# Patient Record
Sex: FEMALE | Race: WHITE | Employment: UNEMPLOYED | URBAN - METROPOLITAN AREA
[De-identification: names, ages, dates, MRNs, and addresses within clinical notes are randomized per-mention and may not be internally consistent; named-entity substitution may affect disease eponyms.]

---

## 2018-03-20 ENCOUNTER — HOSPITAL ENCOUNTER (EMERGENCY)
Facility: HOSPITAL | Age: 33
Discharge: HOME | End: 2018-03-20
Attending: EMERGENCY MEDICINE
Payer: MEDICAID

## 2018-03-20 VITALS
DIASTOLIC BLOOD PRESSURE: 80 MMHG | OXYGEN SATURATION: 95 % | WEIGHT: 175 LBS | HEART RATE: 53 BPM | SYSTOLIC BLOOD PRESSURE: 117 MMHG | RESPIRATION RATE: 16 BRPM | BODY MASS INDEX: 29.16 KG/M2 | HEIGHT: 65 IN | TEMPERATURE: 99 F

## 2018-03-20 DIAGNOSIS — R20.2 PARESTHESIAS: Primary | ICD-10-CM

## 2018-03-20 DIAGNOSIS — L65.9 ALOPECIA OF SCALP: ICD-10-CM

## 2018-03-20 LAB
ALBUMIN SERPL-MCNC: 4.4 G/DL (ref 3.5–4.8)
ALP LIVER SERPL-CCNC: 57 U/L (ref 37–98)
ALT SERPL-CCNC: 15 U/L (ref 14–54)
AST SERPL-CCNC: 13 U/L (ref 15–41)
BASOPHILS # BLD AUTO: 0.04 X10(3) UL (ref 0–0.1)
BASOPHILS NFR BLD AUTO: 0.5 %
BILIRUB SERPL-MCNC: 0.4 MG/DL (ref 0.1–2)
BUN BLD-MCNC: 7 MG/DL (ref 8–20)
CALCIUM BLD-MCNC: 9.3 MG/DL (ref 8.3–10.3)
CHLORIDE: 106 MMOL/L (ref 101–111)
CO2: 21 MMOL/L (ref 22–32)
CREAT BLD-MCNC: 0.91 MG/DL (ref 0.55–1.02)
EOSINOPHIL # BLD AUTO: 0.28 X10(3) UL (ref 0–0.3)
EOSINOPHIL NFR BLD AUTO: 3.5 %
ERYTHROCYTE [DISTWIDTH] IN BLOOD BY AUTOMATED COUNT: 12.2 % (ref 11.5–16)
GLUCOSE BLD-MCNC: 90 MG/DL (ref 70–99)
HAV IGM SER QL: 2.3 MG/DL (ref 1.7–3)
HCT VFR BLD AUTO: 42.5 % (ref 34–50)
HGB BLD-MCNC: 14.9 G/DL (ref 12–16)
IMMATURE GRANULOCYTE COUNT: 0.01 X10(3) UL (ref 0–1)
IMMATURE GRANULOCYTE RATIO %: 0.1 %
LYMPHOCYTES # BLD AUTO: 2.12 X10(3) UL (ref 0.9–4)
LYMPHOCYTES NFR BLD AUTO: 26.2 %
M PROTEIN MFR SERPL ELPH: 8.6 G/DL (ref 6.1–8.3)
MCH RBC QN AUTO: 30.2 PG (ref 27–33.2)
MCHC RBC AUTO-ENTMCNC: 35.1 G/DL (ref 31–37)
MCV RBC AUTO: 86.2 FL (ref 81–100)
MONOCYTES # BLD AUTO: 0.44 X10(3) UL (ref 0.1–1)
MONOCYTES NFR BLD AUTO: 5.4 %
NEUTROPHIL ABS PRELIM: 5.19 X10 (3) UL (ref 1.3–6.7)
NEUTROPHILS # BLD AUTO: 5.19 X10(3) UL (ref 1.3–6.7)
NEUTROPHILS NFR BLD AUTO: 64.3 %
PLATELET # BLD AUTO: 298 10(3)UL (ref 150–450)
POCT URINE PREGNANCY: NEGATIVE
POTASSIUM SERPL-SCNC: 3.6 MMOL/L (ref 3.6–5.1)
RBC # BLD AUTO: 4.93 X10(6)UL (ref 3.8–5.1)
RED CELL DISTRIBUTION WIDTH-SD: 38.3 FL (ref 35.1–46.3)
SODIUM SERPL-SCNC: 136 MMOL/L (ref 136–144)
TSI SER-ACNC: 1.68 MIU/ML (ref 0.35–5.5)
WBC # BLD AUTO: 8.1 X10(3) UL (ref 4–13)

## 2018-03-20 PROCEDURE — 99283 EMERGENCY DEPT VISIT LOW MDM: CPT

## 2018-03-20 PROCEDURE — 80050 GENERAL HEALTH PANEL: CPT | Performed by: EMERGENCY MEDICINE

## 2018-03-20 PROCEDURE — 36415 COLL VENOUS BLD VENIPUNCTURE: CPT

## 2018-03-20 PROCEDURE — 81025 URINE PREGNANCY TEST: CPT

## 2018-03-20 PROCEDURE — 99284 EMERGENCY DEPT VISIT MOD MDM: CPT

## 2018-03-20 PROCEDURE — 83735 ASSAY OF MAGNESIUM: CPT | Performed by: EMERGENCY MEDICINE

## 2018-03-20 RX ORDER — ASPIRIN 81 MG/1
324 TABLET, CHEWABLE ORAL ONCE
Status: DISCONTINUED | OUTPATIENT
Start: 2018-03-20 | End: 2018-03-20

## 2018-03-20 NOTE — ED PROVIDER NOTES
Patient Seen in: [facility] Emergency Department    History   Patient presents with:   Other    Stated Complaint: \"stinging pain on my scalp & loosing clumps of my hair\"    HPI    Patient is a 25-year-old female with a history of depression and PCO Pulmonary/Chest: Effort normal and breath sounds normal.   Abdominal: Soft. Bowel sounds are normal.   Musculoskeletal: Normal range of motion. Neurological: She is alert and oriented to person, place, and time. Skin: Skin is warm and dry.    No scalp are all normal.  Patient will be discharged, recommend follow-up with her primary care doctor for further evaluation.       Disposition and Plan     Clinical Impression:  Paresthesias  (primary encounter diagnosis)  Alopecia of scalp    Disposition:  Shanae Greer

## 2018-03-20 NOTE — ED INITIAL ASSESSMENT (HPI)
Pt c/o \"very intense itching and burning like my scalp is on fire. I've also been loosing clumps of my hair which has never happened before\". This has been going on for 1-2 months but patient states she feels it has gotten worse over the past week.

## 2018-07-13 ENCOUNTER — TELEPHONE (OUTPATIENT)
Dept: FAMILY MEDICINE CLINIC | Facility: CLINIC | Age: 33
End: 2018-07-13

## 2018-07-13 NOTE — TELEPHONE ENCOUNTER
Patient made an appt via my chart for Dr Andres Gardner but a different PCP is listed. She also has [employer] replacement HMO now so I l/m vc mail to confirm who the PCP is on her new coverage card.  Must be Andres Gardner in order for us to see her

## 2018-07-13 NOTE — TELEPHONE ENCOUNTER
I called [facility] and confirmed her PCP for her ins is Dr. Vlad Suarez, nor Bhavani Thompson. The patient can call to change her PCP.  I called the patient and left another message advising I am cancelling the appt she has scheduled and that we are happy to

## 2019-08-11 ENCOUNTER — HOSPITAL ENCOUNTER (EMERGENCY)
Facility: HOSPITAL | Age: 34
Discharge: HOME | End: 2019-08-11
Attending: EMERGENCY MEDICINE
Payer: MEDICAID

## 2019-08-11 VITALS
HEIGHT: 65 IN | HEART RATE: 62 BPM | TEMPERATURE: 98 F | BODY MASS INDEX: 29.99 KG/M2 | RESPIRATION RATE: 16 BRPM | OXYGEN SATURATION: 95 % | DIASTOLIC BLOOD PRESSURE: 66 MMHG | SYSTOLIC BLOOD PRESSURE: 137 MMHG | WEIGHT: 180 LBS

## 2019-08-11 DIAGNOSIS — S90.569A INSECT BITE OF ANKLE, UNSPECIFIED LATERALITY, INITIAL ENCOUNTER: Primary | ICD-10-CM
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
## 2019-08-11 PROCEDURE — 99283 EMERGENCY DEPT VISIT LOW MDM: CPT

## 2019-08-11 RX ORDER — PREDNISONE 20 MG/1
20 TABLET ORAL DAILY
Qty: 3 TABLET | Refills: 0 | Status: SHIPPED | OUTPATIENT
Start: 2019-08-11 | End: 2019-08-14

## 2019-08-11 RX ORDER — HYDROXYZINE HYDROCHLORIDE 25 MG/1
TABLET, FILM COATED ORAL EVERY 4 HOURS PRN
Qty: 20 TABLET | Refills: 0 | Status: SHIPPED | OUTPATIENT
Start: 2019-08-11 | End: 2019-09-10

## 2019-08-11 NOTE — ED INITIAL ASSESSMENT (HPI)
Pt complains of \"bites\" coming out on body,  Knees to feet bilaterally, and \"a big one\" on her left shoulder.

## 2019-08-12 ENCOUNTER — TELEPHONE (OUTPATIENT)
Dept: FAMILY MEDICINE CLINIC | Facility: CLINIC | Age: 34
End: 2019-08-12

## 2019-08-12 NOTE — ED PROVIDER NOTES
Patient Seen in: [facility] Emergency Department    History   Patient presents with:  Bite Sting,Insect (integumentary)    Stated Complaint: rash - possible bug bites     HPI    Patient presents with bites.   The patient states that she noticed bites and oriented x3 in no acute distress. HEENT: Normocephalic, atraumatic, pupils equal round and reactive to light, oropharynx clear, uvula midline-no edema. Neck: Supple. Cardiovascular: Regular rate and rhythm, no murmurs.   Respiratory: Lungs clear to a

## 2019-10-03 ENCOUNTER — HOSPITAL ENCOUNTER (EMERGENCY)
Facility: HOSPITAL | Age: 34
Discharge: HOME | End: 2019-10-03
Attending: EMERGENCY MEDICINE

## 2019-10-03 VITALS
SYSTOLIC BLOOD PRESSURE: 144 MMHG | RESPIRATION RATE: 16 BRPM | WEIGHT: 180 LBS | TEMPERATURE: 97 F | HEART RATE: 65 BPM | OXYGEN SATURATION: 95 % | BODY MASS INDEX: 29.99 KG/M2 | HEIGHT: 65 IN | DIASTOLIC BLOOD PRESSURE: 85 MMHG

## 2019-10-03 DIAGNOSIS — J01.90 ACUTE NON-RECURRENT SINUSITIS, UNSPECIFIED LOCATION: Primary | ICD-10-CM

## 2019-10-03 PROCEDURE — 99283 EMERGENCY DEPT VISIT LOW MDM: CPT

## 2019-10-03 RX ORDER — AZITHROMYCIN 250 MG/1
250 TABLET, FILM COATED ORAL DAILY
Qty: 6 TABLET | Refills: 0 | Status: SHIPPED | OUTPATIENT
Start: 2019-10-03 | End: 2019-10-08

## 2019-10-03 NOTE — ED PROVIDER NOTES
Patient Seen in: [facility] Emergency Department      History   Patient presents with:  Cough/URI    Stated Complaint: cold    HPI    41-year-old female comes to the hospital the complaint of having difficulty with sinus pressure.   The patient has p tenderness  Extremity no clubbing, cyanosis or edema noted.   Full range of motion noted without tenderness  Neuro: No focal deficits noted    ED Course   Labs Reviewed - No data to display       Patient will be discharged with outpatient management for acu

## 2019-10-03 NOTE — ED INITIAL ASSESSMENT (HPI)
Pt c/o sore throat, chills and runny nose since Sunday.      Pt states she tried OTC meds with no relief

## 2020-12-08 ENCOUNTER — APPOINTMENT (OUTPATIENT)
Dept: CT IMAGING | Facility: HOSPITAL | Age: 35
End: 2020-12-08
Attending: EMERGENCY MEDICINE
Payer: MEDICAID

## 2020-12-08 ENCOUNTER — APPOINTMENT (OUTPATIENT)
Dept: GENERAL RADIOLOGY | Facility: HOSPITAL | Age: 35
End: 2020-12-08
Attending: EMERGENCY MEDICINE
Payer: MEDICAID

## 2020-12-08 ENCOUNTER — HOSPITAL ENCOUNTER (EMERGENCY)
Facility: HOSPITAL | Age: 35
Discharge: HOME | End: 2020-12-08
Attending: EMERGENCY MEDICINE
Payer: MEDICAID

## 2020-12-08 VITALS
RESPIRATION RATE: 16 BRPM | OXYGEN SATURATION: 95 % | SYSTOLIC BLOOD PRESSURE: 116 MMHG | DIASTOLIC BLOOD PRESSURE: 68 MMHG | BODY MASS INDEX: 29.99 KG/M2 | WEIGHT: 180 LBS | HEART RATE: 57 BPM | HEIGHT: 65 IN | TEMPERATURE: 99 F

## 2020-12-08 DIAGNOSIS — R93.5 ABNORMAL CT OF THE ABDOMEN: ICD-10-CM

## 2020-12-08 DIAGNOSIS — K59.00 CONSTIPATION, UNSPECIFIED CONSTIPATION TYPE: Primary | ICD-10-CM

## 2020-12-08 PROCEDURE — 99285 EMERGENCY DEPT VISIT HI MDM: CPT

## 2020-12-08 PROCEDURE — 81001 URINALYSIS AUTO W/SCOPE: CPT | Performed by: EMERGENCY MEDICINE

## 2020-12-08 PROCEDURE — 83690 ASSAY OF LIPASE: CPT | Performed by: EMERGENCY MEDICINE

## 2020-12-08 PROCEDURE — 81025 URINE PREGNANCY TEST: CPT

## 2020-12-08 PROCEDURE — 85025 COMPLETE CBC W/AUTO DIFF WBC: CPT | Performed by: EMERGENCY MEDICINE

## 2020-12-08 PROCEDURE — 74177 CT ABD & PELVIS W/CONTRAST: CPT | Performed by: EMERGENCY MEDICINE

## 2020-12-08 PROCEDURE — 96360 HYDRATION IV INFUSION INIT: CPT

## 2020-12-08 PROCEDURE — 96361 HYDRATE IV INFUSION ADD-ON: CPT

## 2020-12-08 PROCEDURE — 80053 COMPREHEN METABOLIC PANEL: CPT | Performed by: EMERGENCY MEDICINE

## 2020-12-08 RX ORDER — SODIUM CHLORIDE 9 MG/ML
INJECTION, SOLUTION INTRAVENOUS CONTINUOUS
Status: DISCONTINUED | OUTPATIENT
Start: 2020-12-08 | End: 2020-12-08

## 2020-12-08 NOTE — ED INITIAL ASSESSMENT (HPI)
Pt to ED for c/o RUQ pain intermittent x 4 months, worsened over the the last few days w/ occasional nausea. Hx of Chronic Constipation & IBS. Last BM yesterday.

## 2020-12-08 NOTE — ED PROVIDER NOTES
CM called and spoke with patient, explained outpt CM role with introduction  Patient agreeable to follow up  CM reviewed discharge instructions and medication list with patient  Patient has been to PCP for hospital follow up , has another appt scheduled for September, saw Cardiology on 6/5 with follow up in November, Patient stated she did not want to follow up with Pulmonologist or have sleep study   Patient stated she manages her own medications  Patient stating she is feeling good, cough is gone, denies CP, SOB ,fevers  Patient's hsuband takes her BP few times a week, last reading was 132/59  Patient does not drive, her , family or friends provide transportation  CM made patient aware CM will follow up in 2-3 weeks  Patient wanted to wait to receive business card in the mail  CM mailed business card and bundle paperwork to patient  Patient Seen in: [facility] Emergency Department      History   Patient presents with:  Abdomen/Flank Pain    Stated Complaint: abdominal pain, hx IBS/constipation    HPI    Patient is a pleasant 13-year-old female, presenting for evaluation of abdo comfortably on the cart, in no apparent distress. HEENT: Head is without evidence of trauma. Extraocular muscles are intact. Pupils are equal and reactive to light. NECK: Neck is supple. The trachea is midline.    LUNGS: Lungs are clear, respirations ar CT, CT APPENDIX ABD/PEL WO CONTRAST (CPT=74176), 5/08/2016, 7:01 PM.  INDICATIONS:  abdominal pain, hx IBS/constipation  TECHNIQUE:  CT scanning was performed from the dome of the diaphragm to the pubic symphysis with non-ionic intravenous contrast materia thickening, lesion, or calculus. PELVIC NODES:  No adenopathy. PELVIC ORGANS:  The uterus and adnexa appear normal.  There is a 1.5 cm nabothian cysts noted. BONES:  No bony lesion or fracture. LUNG BASES:  No visible pulmonary or pleural disease.   OTHE subsequently discharged at her request.                   Disposition and Plan     Clinical Impression:  Constipation, unspecified constipation type  (primary encounter diagnosis)  Abnormal CT of the abdomen    Disposition:  Discharge  12/8/2020  5:34 pm

## 2020-12-14 ENCOUNTER — ORDER TRANSCRIPTION (OUTPATIENT)
Dept: ADMINISTRATIVE | Facility: HOSPITAL | Age: 35
End: 2020-12-14

## 2020-12-14 DIAGNOSIS — Z20.822 ENCOUNTER FOR LABORATORY TESTING FOR COVID-19 VIRUS: Primary | ICD-10-CM

## 2020-12-14 DIAGNOSIS — Z01.812 PRE-PROCEDURE LAB EXAM: ICD-10-CM

## 2020-12-15 ENCOUNTER — ORDER TRANSCRIPTION (OUTPATIENT)
Dept: ADMINISTRATIVE | Facility: HOSPITAL | Age: 35
End: 2020-12-15

## 2020-12-15 DIAGNOSIS — Z11.59 ENCOUNTER FOR SCREENING FOR OTHER VIRAL DISEASES: Primary | ICD-10-CM

## 2020-12-15 DIAGNOSIS — Z01.818 PREOP EXAMINATION: ICD-10-CM

## 2020-12-20 ENCOUNTER — LAB ENCOUNTER (OUTPATIENT)
Dept: LAB | Facility: HOSPITAL | Age: 35
End: 2020-12-20
Attending: EMERGENCY MEDICINE
Payer: MEDICAID

## 2020-12-20 DIAGNOSIS — Z11.59 ENCOUNTER FOR SCREENING FOR OTHER VIRAL DISEASES: ICD-10-CM

## 2020-12-20 DIAGNOSIS — Z20.822 ENCOUNTER FOR LABORATORY TESTING FOR COVID-19 VIRUS: ICD-10-CM

## 2020-12-20 DIAGNOSIS — Z01.818 PREOP EXAMINATION: ICD-10-CM

## 2020-12-20 DIAGNOSIS — Z01.812 PRE-PROCEDURE LAB EXAM: ICD-10-CM

## 2020-12-22 DIAGNOSIS — K59.00 CONSTIPATION, UNSPECIFIED CONSTIPATION TYPE: Primary | ICD-10-CM

## 2021-01-28 ENCOUNTER — OFFICE VISIT (OUTPATIENT)
Dept: FAMILY MEDICINE CLINIC | Facility: CLINIC | Age: 36
End: 2021-01-28
Payer: MEDICAID

## 2021-01-28 ENCOUNTER — LAB ENCOUNTER (OUTPATIENT)
Dept: LAB | Age: 36
End: 2021-01-28
Attending: FAMILY MEDICINE
Payer: MEDICAID

## 2021-01-28 VITALS
HEART RATE: 71 BPM | BODY MASS INDEX: 30 KG/M2 | OXYGEN SATURATION: 96 % | TEMPERATURE: 99 F | DIASTOLIC BLOOD PRESSURE: 82 MMHG | SYSTOLIC BLOOD PRESSURE: 122 MMHG | RESPIRATION RATE: 16 BRPM | HEIGHT: 65 IN

## 2021-01-28 DIAGNOSIS — K59.00 CONSTIPATION, UNSPECIFIED CONSTIPATION TYPE: ICD-10-CM

## 2021-01-28 DIAGNOSIS — Z13.220 SCREENING CHOLESTEROL LEVEL: ICD-10-CM

## 2021-01-28 DIAGNOSIS — K59.00 CONSTIPATION, UNSPECIFIED CONSTIPATION TYPE: Primary | ICD-10-CM

## 2021-01-28 PROBLEM — F31.81 BIPOLAR 2 DISORDER (HCC): Status: ACTIVE | Noted: 2017-03-02

## 2021-01-28 PROBLEM — R10.9 ABDOMINAL PAIN: Status: ACTIVE | Noted: 2017-07-06

## 2021-01-28 LAB
CHOLEST SMN-MCNC: 187 MG/DL (ref ?–200)
HDLC SERPL-MCNC: 45 MG/DL (ref 40–59)
LDLC SERPL CALC-MCNC: 107 MG/DL (ref ?–100)
NONHDLC SERPL-MCNC: 142 MG/DL (ref ?–130)
PATIENT FASTING Y/N/NP: YES
T4 FREE SERPL-MCNC: 1.1 NG/DL (ref 0.8–1.7)
TRIGL SERPL-MCNC: 173 MG/DL (ref 30–149)
TSI SER-ACNC: 1.62 MIU/ML (ref 0.36–3.74)
VLDLC SERPL CALC-MCNC: 35 MG/DL (ref 0–30)

## 2021-01-28 PROCEDURE — 3079F DIAST BP 80-89 MM HG: CPT | Performed by: FAMILY MEDICINE

## 2021-01-28 PROCEDURE — 3074F SYST BP LT 130 MM HG: CPT | Performed by: FAMILY MEDICINE

## 2021-01-28 PROCEDURE — 80061 LIPID PANEL: CPT

## 2021-01-28 PROCEDURE — 84443 ASSAY THYROID STIM HORMONE: CPT

## 2021-01-28 PROCEDURE — 3008F BODY MASS INDEX DOCD: CPT | Performed by: FAMILY MEDICINE

## 2021-01-28 PROCEDURE — 99204 OFFICE O/P NEW MOD 45 MIN: CPT | Performed by: FAMILY MEDICINE

## 2021-01-28 PROCEDURE — 84439 ASSAY OF FREE THYROXINE: CPT

## 2021-01-28 PROCEDURE — 36415 COLL VENOUS BLD VENIPUNCTURE: CPT

## 2021-01-28 NOTE — PATIENT INSTRUCTIONS
Dietary ways to work with constipation:   -prunes/juice, pear juice  -high fiber foods (raw fruits and veggies minus bananas, brown rice, whole grain bread).    -limit breads,cheeses, and bananas  -increase water  Stool softeners: fiber supplements eg: [place] Constipation is very common. At some point in life, it affects almost everyone. Since everyone's bowel habits are different, what is constipation to one person may not be to another. Your healthcare provider may do tests to diagnose constipation.  It depend Lifestyle changes  These lifestyle changes can help prevent constipation:  · Diet. Eat a high-fiber diet, with fresh fruit and vegetables, and reduce dairy intake, meats, and processed foods  · Fluids. It's important to get enough fluids each day.  Drink pl Vaughn last reviewed this educational content on 6/1/2018  © 9722-2262 [employer]. [facility] [place]. All rights reserved. This information is not intended as a substitute for professional medical care.  Always follo · Beans. One cup of cooked lentils gives you over 15 grams of fiber. Try navy beans, lentils, and chickpeas. · Seeds. A small handful of seeds gives you about 3 grams of fiber. Try sunflower or kajal seeds.   Keep track of your fiber  Keep track of how much One of the best ways to help treat constipation is to increase your fiber intake. You can do this either through diet or by using fiber supplements. Fiber (in whole grains, fruits, and vegetables) adds bulk and absorbs water to soften the stool.  This helps

## 2021-01-28 NOTE — PROGRESS NOTES
[facility] Visit Note  1/28/2021      Subjective:      Patient ID: Alo Garay is a 38year old female.     Chief Complaint:  Patient presents with:  New Patient  Stomach Pain: and constipation, was treated at [facility] ER 12/8/20      HPI:  Aris Erickson 01/28/21  0850   BP: 122/82   Pulse: 71   Resp: 16   Temp: 98.6 °F (37 °C)   TempSrc: Temporal   SpO2: 96%   Height: 5' 5\" (1.651 m)       Physical Examination   General:  Alert, in no acute distress  HEENT: NCAT, EOMI, normal TMs, oropharynx, and nasal

## 2021-02-03 ENCOUNTER — TELEPHONE (OUTPATIENT)
Dept: FAMILY MEDICINE CLINIC | Facility: CLINIC | Age: 36
End: 2021-02-03

## 2021-02-03 NOTE — TELEPHONE ENCOUNTER
Pt is requesting MD put in a script for pain due to lower abdominal pain.      LOV: 1/28/2021 (New patient, constipation)   Pt does not want to see a specialist.    [address] [facility] [phone], [phone]

## 2021-02-04 NOTE — TELEPHONE ENCOUNTER
Pain medications can actually worsen constipation. TO see the specialist please. If still has lots of questions or not satisfied with plan to make sooner appt.  thanks

## 2021-02-07 ENCOUNTER — LAB ENCOUNTER (OUTPATIENT)
Dept: LAB | Facility: HOSPITAL | Age: 36
End: 2021-02-07
Attending: COLON & RECTAL SURGERY
Payer: MEDICAID

## 2021-02-07 DIAGNOSIS — K59.00 CONSTIPATION, UNSPECIFIED CONSTIPATION TYPE: ICD-10-CM

## 2021-02-09 ENCOUNTER — TELEPHONE (OUTPATIENT)
Dept: SURGERY | Facility: CLINIC | Age: 36
End: 2021-02-09

## 2021-02-09 LAB — SARS-COV-2 RNA RESP QL NAA+PROBE: NOT DETECTED

## 2021-02-09 NOTE — TELEPHONE ENCOUNTER
Pt asking how long her radiology test would take. Gve her information for scheduling and they would have this info.

## 2021-02-10 ENCOUNTER — HOSPITAL ENCOUNTER (OUTPATIENT)
Dept: GENERAL RADIOLOGY | Age: 36
Discharge: HOME | End: 2021-02-10
Attending: COLON & RECTAL SURGERY
Payer: MEDICAID

## 2021-02-10 DIAGNOSIS — K59.00 CONSTIPATION, UNSPECIFIED CONSTIPATION TYPE: ICD-10-CM

## 2021-02-26 ENCOUNTER — PATIENT MESSAGE (OUTPATIENT)
Dept: FAMILY MEDICINE CLINIC | Facility: CLINIC | Age: 36
End: 2021-02-26

## 2021-02-26 RX ORDER — ONDANSETRON 4 MG/1
4 TABLET, ORALLY DISINTEGRATING ORAL EVERY 8 HOURS PRN
Qty: 20 TABLET | Refills: 0 | Status: SHIPPED | OUTPATIENT
Start: 2021-02-26 | End: 2021-03-01

## 2021-02-26 NOTE — TELEPHONE ENCOUNTER
See pt message, please advise, recommend pt follow up with GI if she is not comfortable seeing Gen Surg?

## 2021-02-26 NOTE — TELEPHONE ENCOUNTER
From: Patrice Blood  To: Rayo Taylor MD  Sent: 2/26/2021 8:38 AM CST  Subject: Other    Hi Dr Luis Jennings,    I am reaching out to you, because lately I have not gotten better when it goes for the chronic constipation and stomach pain.      When I d

## 2021-03-01 ENCOUNTER — TELEMEDICINE (OUTPATIENT)
Dept: FAMILY MEDICINE CLINIC | Facility: CLINIC | Age: 36
End: 2021-03-01

## 2021-03-01 ENCOUNTER — PATIENT MESSAGE (OUTPATIENT)
Dept: FAMILY MEDICINE CLINIC | Facility: CLINIC | Age: 36
End: 2021-03-01

## 2021-03-01 DIAGNOSIS — N92.1 MENORRHAGIA WITH IRREGULAR CYCLE: ICD-10-CM

## 2021-03-01 DIAGNOSIS — N92.6 IRREGULAR PERIODS: Primary | ICD-10-CM

## 2021-03-01 DIAGNOSIS — E28.2 PCOS (POLYCYSTIC OVARIAN SYNDROME): ICD-10-CM

## 2021-03-01 PROBLEM — R10.9 ABDOMINAL PAIN: Status: RESOLVED | Noted: 2017-07-06 | Resolved: 2021-03-01

## 2021-03-01 PROCEDURE — 99214 OFFICE O/P EST MOD 30 MIN: CPT | Performed by: FAMILY MEDICINE

## 2021-03-01 RX ORDER — NORGESTIMATE AND ETHINYL ESTRADIOL 0.25-0.035
1 KIT ORAL DAILY
Qty: 84 TABLET | Refills: 3 | Status: SHIPPED | OUTPATIENT
Start: 2021-03-01 | End: 2021-03-04 | Stop reason: SINTOL

## 2021-03-01 RX ORDER — ONDANSETRON 4 MG/1
4 TABLET, ORALLY DISINTEGRATING ORAL EVERY 8 HOURS PRN
Qty: 20 TABLET | Refills: 0 | Status: SHIPPED | OUTPATIENT
Start: 2021-03-01 | End: 2021-11-15 | Stop reason: ALTCHOICE

## 2021-03-01 NOTE — PROGRESS NOTES
Video Visit- Yg   This is a telemedicine visit with live, interactive video and audio.      Fatoumata Flores understands and accepts financial responsibility for any deductible, co-insurance and/or co-pa breathing  ABDOMEN:  not obese  MUSCULOSKELETAL: Sitting upright. NEUROLOGIC:  Cranial nerves 2-12 grossly intact. PSYCHIATRIC:  Normal mood, affect, and hygiene. Assessment/Plan:  1.  Irregular periods  - OBG - INTERNAL  - [employer] Briana Johnson

## 2021-03-03 RX ORDER — ONDANSETRON 4 MG/1
4 TABLET, ORALLY DISINTEGRATING ORAL EVERY 8 HOURS PRN
Qty: 20 TABLET | Refills: 0 | Status: CANCELLED | OUTPATIENT
Start: 2021-03-03

## 2021-03-03 NOTE — TELEPHONE ENCOUNTER
ondansetron 4 MG Oral Tablet Dispersible          Sig: Take 1 tablet (4 mg total) by mouth every 8 (eight) hours as needed for Nausea.     Disp:  20 tablet    Refills:  0    Start: 3/3/2021    Class: Normal    Non-formulary    Last ordered: 2 days ago by [employer]

## 2021-04-05 ENCOUNTER — PATIENT MESSAGE (OUTPATIENT)
Dept: FAMILY MEDICINE CLINIC | Facility: CLINIC | Age: 36
End: 2021-04-05

## 2021-04-05 NOTE — TELEPHONE ENCOUNTER
From: Patrice Blood  To: Rayo Taylor MD  Sent: 4/5/2021 10:16 AM CDT  Subject: Other    Hi Dr. Randall Morton all is well with you. I am reaching out to you because I have been battling with really bad depression and really bad anxiety.      I

## 2021-04-06 PROBLEM — F40.00 AGORAPHOBIA: Status: ACTIVE | Noted: 2021-04-06

## 2021-04-06 PROBLEM — F33.1 MODERATE EPISODE OF RECURRENT MAJOR DEPRESSIVE DISORDER (HCC): Status: ACTIVE | Noted: 2021-04-06

## 2021-04-06 PROBLEM — F41.0 PANIC ATTACK: Status: ACTIVE | Noted: 2021-04-06

## 2021-04-06 NOTE — PROGRESS NOTES
Video Visit- Yg 26  This is a telemedicine visit with live, interactive video and audio.      Yeimi Crow understands and accepts financial responsibility for any deductible, co-insurance and/or co-pa comfortably, normal work of breathing  ABDOMEN:  not obese  MUSCULOSKELETAL: Sitting upright. NEUROLOGIC:  Cranial nerves 2-12 grossly intact. PSYCHIATRIC:  Sad mood, normal affect, and hygiene. Assessment/Plan:  1.  Moderate episode of recurrent

## 2021-05-04 ENCOUNTER — OFFICE VISIT (OUTPATIENT)
Dept: SURGERY | Facility: CLINIC | Age: 36
End: 2021-05-04
Payer: MEDICAID

## 2021-05-04 VITALS
WEIGHT: 185 LBS | DIASTOLIC BLOOD PRESSURE: 90 MMHG | HEIGHT: 65 IN | HEART RATE: 75 BPM | BODY MASS INDEX: 30.82 KG/M2 | TEMPERATURE: 98 F | SYSTOLIC BLOOD PRESSURE: 129 MMHG

## 2021-05-04 DIAGNOSIS — Z01.818 PRE-OP TESTING: ICD-10-CM

## 2021-05-04 DIAGNOSIS — K59.00 CONSTIPATION, UNSPECIFIED CONSTIPATION TYPE: Primary | ICD-10-CM

## 2021-05-04 PROCEDURE — 3074F SYST BP LT 130 MM HG: CPT | Performed by: COLON & RECTAL SURGERY

## 2021-05-04 PROCEDURE — 99244 OFF/OP CNSLTJ NEW/EST MOD 40: CPT | Performed by: COLON & RECTAL SURGERY

## 2021-05-04 PROCEDURE — 3080F DIAST BP >= 90 MM HG: CPT | Performed by: COLON & RECTAL SURGERY

## 2021-05-04 PROCEDURE — 3008F BODY MASS INDEX DOCD: CPT | Performed by: COLON & RECTAL SURGERY

## 2021-05-04 RX ORDER — SODIUM, POTASSIUM,MAG SULFATES 17.5-3.13G
SOLUTION, RECONSTITUTED, ORAL ORAL
Qty: 1 KIT | Refills: 0 | Status: SHIPPED | OUTPATIENT
Start: 2021-05-04 | End: 2021-05-28

## 2021-05-04 NOTE — H&P
New Patient Visit Note       Active Problems      1. Constipation, unspecified constipation type    2.  Pre-op testing        Chief Complaint   Patient presents with:  Abdominal Pain      History of Present Illness   Sarah Deal is a 38year old femal • Chronic constipation    • Depression    • IBS (irritable bowel syndrome)    • Ovarian cyst    • Polycystic ovarian syndrome      Past Surgical History:   Procedure Laterality Date   • D & C         The family history and social history have been revie were discussed as well. The risks of colonoscopy were discussed with the patient and include but are not limited to post-procedure bleeding, infection, colorectal perforation, splenic injury, missed polyps, need for additional surgeries or interventions.  A

## 2021-05-08 ENCOUNTER — PATIENT MESSAGE (OUTPATIENT)
Dept: FAMILY MEDICINE CLINIC | Facility: CLINIC | Age: 36
End: 2021-05-08

## 2021-05-08 DIAGNOSIS — K59.00 CONSTIPATION, UNSPECIFIED CONSTIPATION TYPE: Primary | ICD-10-CM

## 2021-05-10 NOTE — TELEPHONE ENCOUNTER
From: Anne Benavides  To:  Ness Marquez MD  Sent: 5/8/2021 1:13 PM CDT  Subject: Referral Request    Hi Dr. Sanjeev Lazo or Nurse/Staff,    I am reaching out to see if there is a way for Dr. Sanjeev Lazo to refer me to a different gastroenterologist that is

## 2021-05-12 ENCOUNTER — TELEPHONE (OUTPATIENT)
Dept: SURGERY | Facility: CLINIC | Age: 36
End: 2021-05-12

## 2021-05-12 NOTE — TELEPHONE ENCOUNTER
Pt called and left a message to [facility] on 5-18.  I tried calling pt back, no answer I left a voicemail

## 2021-05-24 ENCOUNTER — TELEPHONE (OUTPATIENT)
Dept: SURGERY | Facility: CLINIC | Age: 36
End: 2021-05-24

## 2021-05-24 NOTE — TELEPHONE ENCOUNTER
Called asking for reactions on meds and other options than colonoscopy. Question: Is there any interactions between Dramamine and aspirin.      I informed the patient that there was not a nurse available at the moment and put her on hold while  I asked M

## 2021-05-26 ENCOUNTER — TELEPHONE (OUTPATIENT)
Dept: SURGERY | Facility: CLINIC | Age: 36
End: 2021-05-26

## 2021-05-26 NOTE — TELEPHONE ENCOUNTER
Patient called and an RL6 was filled out. Patient felt she called and her concerns were not handled appropriately. I called patient and listened to her complaint.   I did tell her that I would talk to the employee that she felt didn't handle the situation c

## 2021-05-28 ENCOUNTER — TELEPHONE (OUTPATIENT)
Dept: SURGERY | Facility: CLINIC | Age: 36
End: 2021-05-28

## 2021-05-28 RX ORDER — POLYETHYLENE GLYCOL 3350, SODIUM CHLORIDE, SODIUM BICARBONATE, POTASSIUM CHLORIDE 420; 11.2; 5.72; 1.48 G/4L; G/4L; G/4L; G/4L
POWDER, FOR SOLUTION ORAL
Qty: 4000 ML | Refills: 0 | Status: SHIPPED | OUTPATIENT
Start: 2021-05-28 | End: 2021-11-15

## 2021-05-28 NOTE — TELEPHONE ENCOUNTER
Per Dr. Ha Rodriguez, patient to take mag citrate today and Miralax twice daily beginning tonight. Called patient and notified. She voiced understanding. Advised patient to call back and speak with physician on call if needed if lack of bowel movement persists.  E

## 2021-05-28 NOTE — TELEPHONE ENCOUNTER
[address] Gerardo calling office stating Francesca Look is not covered with patients plan. Trilyte available and in stock. Will send new script for trilyte and send instructions to patient via Sukhwinder Tubbs. Called patient to inform. She voiced understanding.  Patient

## 2021-06-01 ENCOUNTER — MED REC SCAN ONLY (OUTPATIENT)
Dept: SURGERY | Facility: CLINIC | Age: 36
End: 2021-06-01

## 2021-06-02 ENCOUNTER — TELEPHONE (OUTPATIENT)
Dept: SURGERY | Facility: CLINIC | Age: 36
End: 2021-06-02

## 2021-06-02 NOTE — TELEPHONE ENCOUNTER
Rec'd a message from [facility], pt was a no show for her Colonoscopy on 6-1-21.  I tried calling pt to r/s but her phone just rang with no voicemail set up

## 2021-07-01 ENCOUNTER — OFFICE VISIT (OUTPATIENT)
Dept: GASTROENTEROLOGY | Facility: CLINIC | Age: 36
End: 2021-07-01
Payer: MEDICAID

## 2021-07-01 VITALS
HEART RATE: 69 BPM | SYSTOLIC BLOOD PRESSURE: 117 MMHG | WEIGHT: 148 LBS | HEIGHT: 65 IN | DIASTOLIC BLOOD PRESSURE: 84 MMHG | BODY MASS INDEX: 24.66 KG/M2

## 2021-07-01 DIAGNOSIS — R93.5 ABNORMAL CT SCAN, PELVIS: ICD-10-CM

## 2021-07-01 DIAGNOSIS — R93.5 ABNORMAL CT OF THE ABDOMEN: ICD-10-CM

## 2021-07-01 DIAGNOSIS — R10.9 ABDOMINAL PAIN, UNSPECIFIED ABDOMINAL LOCATION: Primary | ICD-10-CM

## 2021-07-01 DIAGNOSIS — K59.00 CONSTIPATION, UNSPECIFIED CONSTIPATION TYPE: ICD-10-CM

## 2021-07-01 DIAGNOSIS — K63.9 COLON ABNORMALITY: ICD-10-CM

## 2021-07-01 PROCEDURE — 3008F BODY MASS INDEX DOCD: CPT | Performed by: INTERNAL MEDICINE

## 2021-07-01 PROCEDURE — 3074F SYST BP LT 130 MM HG: CPT | Performed by: INTERNAL MEDICINE

## 2021-07-01 PROCEDURE — 3079F DIAST BP 80-89 MM HG: CPT | Performed by: INTERNAL MEDICINE

## 2021-07-01 PROCEDURE — 99204 OFFICE O/P NEW MOD 45 MIN: CPT | Performed by: INTERNAL MEDICINE

## 2021-07-01 NOTE — PATIENT INSTRUCTIONS
1. Your CT scan may require a prior authorization from your insurance. Please call central scheduling at [phone] and schedule your test at least two weeks out to allow [employer] ample time to obtain the authorization on your behalf.  You may NOT und

## 2021-07-01 NOTE — H&P
[facility] Gastroenterology                                                                                                  Clinic History and Physical     Pa Bicarb-NaCl (TRILYTE) 420 g Oral Recon Soln Starting at 4:00 pm the night before procedure, drink 8 ounces of the prep every 15-20 minutes until finished 4000 mL 0   • ALPRAZolam 0.25 MG Oral Tab Take 1 tablet (0.25 mg total) by mouth daily as needed (libertad large amount of stool in the colon as well as note of there is an area of caliber change of the colon without focal mass in the left paramedian abdomen at the level the mid descending colon and there appears to be some tenting of mesentery and slight swirl

## 2021-07-13 ENCOUNTER — PATIENT MESSAGE (OUTPATIENT)
Dept: GASTROENTEROLOGY | Facility: CLINIC | Age: 36
End: 2021-07-13

## 2021-07-14 NOTE — TELEPHONE ENCOUNTER
From: Lance Ramon  To: Yvette Jamil MD  Sent: 7/13/2021 1:27 PM CDT  Subject: Visit Follow-up Question    Hi Dr. Brayan Brizuela,      I hope all is well with you.  I am reaching out because I made a appointment on July 20th for the CT scan test that

## 2021-07-16 NOTE — TELEPHONE ENCOUNTER
To: [facility]      From: Jani Guajardo      Created: 7/15/2021 8:33 PM        Hi Dr. Erlin Polo,     Thank you for getting back to me. Is there any way I can have the barium through a IV?  When I was in the ER back in December I had some form

## 2021-07-19 ENCOUNTER — PATIENT MESSAGE (OUTPATIENT)
Dept: GASTROENTEROLOGY | Facility: CLINIC | Age: 36
End: 2021-07-19

## 2021-07-20 ENCOUNTER — HOSPITAL ENCOUNTER (OUTPATIENT)
Dept: CT IMAGING | Age: 36
Discharge: HOME | End: 2021-07-20
Attending: INTERNAL MEDICINE
Payer: MEDICAID

## 2021-07-20 DIAGNOSIS — R93.5 ABNORMAL CT OF THE ABDOMEN: ICD-10-CM

## 2021-07-20 DIAGNOSIS — K63.9 COLON ABNORMALITY: ICD-10-CM

## 2021-07-20 DIAGNOSIS — R93.5 ABNORMAL CT SCAN, PELVIS: ICD-10-CM

## 2021-07-20 DIAGNOSIS — K59.00 CONSTIPATION, UNSPECIFIED CONSTIPATION TYPE: ICD-10-CM

## 2021-07-20 DIAGNOSIS — R10.9 ABDOMINAL PAIN, UNSPECIFIED ABDOMINAL LOCATION: ICD-10-CM

## 2021-07-20 LAB — CREAT BLD-MCNC: 0.8 MG/DL

## 2021-07-20 PROCEDURE — 74177 CT ABD & PELVIS W/CONTRAST: CPT | Performed by: INTERNAL MEDICINE

## 2021-07-20 PROCEDURE — 82565 ASSAY OF CREATININE: CPT

## 2021-07-21 ENCOUNTER — TELEPHONE (OUTPATIENT)
Dept: GASTROENTEROLOGY | Facility: CLINIC | Age: 36
End: 2021-07-21

## 2021-07-21 NOTE — TELEPHONE ENCOUNTER
I reviewed the images with radiology. There are some non-specific images of the mesentery. Similar to December and question if present even prior to this in 2016. No suggestion of blockage. Discussed this with the patient.  Discussed I'm not convinced the C

## 2021-07-22 ENCOUNTER — PATIENT MESSAGE (OUTPATIENT)
Dept: GASTROENTEROLOGY | Facility: CLINIC | Age: 36
End: 2021-07-22

## 2021-07-22 NOTE — TELEPHONE ENCOUNTER
From: Catie Francis  To:  Bertha Irwin MD  Sent: 7/22/2021 12:07 PM CDT  Subject: Test Results Question    Hi Dr. Apple Player,      I forgot to mention this to you over the telephone yesterday, is there any way I can be put on some sort of medicatio

## 2021-07-24 ENCOUNTER — PATIENT MESSAGE (OUTPATIENT)
Dept: GASTROENTEROLOGY | Facility: CLINIC | Age: 36
End: 2021-07-24

## 2021-09-01 ENCOUNTER — PATIENT MESSAGE (OUTPATIENT)
Dept: GASTROENTEROLOGY | Facility: CLINIC | Age: 36
End: 2021-09-01

## 2021-09-01 NOTE — TELEPHONE ENCOUNTER
From: Dilcia Fernandez  To:  Elda Borrego MD  Sent: 9/1/2021 7:42 AM CDT  Subject: Visit Follow-up Question    Yes I can make the appointment

## 2021-09-01 NOTE — TELEPHONE ENCOUNTER
To: Debi Patterson      From: Barbi Juarez      Created: 9/1/2021 10:00 AM      Froylan Gonsalves,     Your appointment is confirmed with Dr. Vinnie Tellez for Tuesday September 28th at 9 AM at the [facility] office. The office is located at [address] [place] [address]

## 2021-09-08 ENCOUNTER — TELEMEDICINE (OUTPATIENT)
Dept: FAMILY MEDICINE CLINIC | Facility: CLINIC | Age: 36
End: 2021-09-08

## 2021-09-08 DIAGNOSIS — F40.00 AGORAPHOBIA: ICD-10-CM

## 2021-09-08 DIAGNOSIS — K59.04 CHRONIC IDIOPATHIC CONSTIPATION: Primary | ICD-10-CM

## 2021-09-08 DIAGNOSIS — R14.0 BLOATING: ICD-10-CM

## 2021-09-08 DIAGNOSIS — R19.7 DIARRHEA, UNSPECIFIED TYPE: ICD-10-CM

## 2021-09-08 DIAGNOSIS — F33.1 MODERATE EPISODE OF RECURRENT MAJOR DEPRESSIVE DISORDER (HCC): ICD-10-CM

## 2021-09-08 DIAGNOSIS — F31.81 BIPOLAR 2 DISORDER (HCC): ICD-10-CM

## 2021-09-08 PROCEDURE — 99214 OFFICE O/P EST MOD 30 MIN: CPT | Performed by: FAMILY MEDICINE

## 2021-09-08 NOTE — PROGRESS NOTES
Video Visit- Yg   This is a telemedicine visit with live, interactive video and audio.      Fatoumata Flores understands and accepts financial responsibility for any deductible, co-insurance and/or co-pa fearful of getting covid when leaving. Sxs: heart racing, sick in her stomach, crying a lot, difficulty sleeping.   With Medicaid having trouble finding a provider to takes her insurance or $300 for a session.      Meds on in the past:  -Lamictal- was the ([facility]  5. Moerate episode of recurrent major depressive disorder ([facility].)  6. Agoraphobia  -advised her not to wait to fix one problem before she addresses her mental health as they are partially related. -encouraged her to start the lamotrigine.  I feel she

## 2021-10-20 NOTE — PROGRESS NOTES
Telephone Visit- Yg Murillo  This is a telemedicine visit with live, interactive video and audio.      Susy Vu understands and accepts financial responsibility for any deductible, co-insurance and/or c nuno. Or the other meds (ssri) causing her sxs to worsen  -Paxil  -Prozac  -Lexapro- awful side effects and wouldn't go back on it.  Diarrhea/abd pain/bloated, wt gain, headaches/migraines.  -Alprazolam prn        Denies- nuno sxs         Past Medical His provider-patient relationship, due to the ongoing public health crisis/national emergency and because of restrictions of visitation. There are limitations of this visit as physical examination was limited.   Appropriate medical decision-making and tests are

## 2021-11-15 NOTE — PROGRESS NOTES
[facility] Visit Note  11/15/2021      Subjective:      Patient ID: Dilcia Fernandez is a 43year old female.     Chief Complaint:  Patient presents with:  Medication Follow-Up: on the Lamictal that was ordered back in April, but states she didn' her insurance or $300 for a session.      Meds on in the past:  -Lamictal- was the most recent. Had to get off due to not seeing the same doctors/insurance issues.  Felt it may have worked the best. Mother had bipolar d/o, says she was never diagnosed with [facility]  - FLUoxetine 20 MG Oral Cap; Take 1 capsule (20 mg total) by mouth daily. Dispense: 30 capsule; Refill: 0  - OP REFERRAL TO [facility]  - lamoTRIgine 25 MG Oral Tab; Take 1 tab daily x 2 wks then stop  Dispense: 14 tablet;  Refill: 0  -pt denies bi

## 2021-11-16 ENCOUNTER — PATIENT MESSAGE (OUTPATIENT)
Dept: FAMILY MEDICINE CLINIC | Facility: CLINIC | Age: 36
End: 2021-11-16

## 2021-11-16 DIAGNOSIS — R63.4 WEIGHT LOSS: Primary | ICD-10-CM

## 2021-11-16 DIAGNOSIS — Z00.00 LABORATORY EXAM ORDERED AS PART OF ROUTINE GENERAL MEDICAL EXAMINATION: ICD-10-CM

## 2021-11-16 NOTE — TELEPHONE ENCOUNTER
From: Keiry Davis  To: Johan Euceda MD  Sent: 11/16/2021 4:47 PM CST  Subject: Follow up question from 11/15 visit       Hi Dr. Rodrigue Leggett,    I want to ask you a question yesterday during my visit.  I wanted to know if I can request blood tests t

## 2021-11-20 NOTE — TELEPHONE ENCOUNTER
Please have her get a FIT test done and fasting blood tests I just ordered. I'll call with results, but otherwise her change in diet and fear of eating can definetly account for this. So see ISABEL Li, as planned and he can further her evaluation.

## 2021-11-22 ENCOUNTER — APPOINTMENT (OUTPATIENT)
Dept: GENERAL RADIOLOGY | Age: 36
End: 2021-11-22
Attending: EMERGENCY MEDICINE
Payer: MEDICAID

## 2021-11-22 ENCOUNTER — HOSPITAL ENCOUNTER (EMERGENCY)
Age: 36
Discharge: HOME | End: 2021-11-22
Attending: EMERGENCY MEDICINE
Payer: MEDICAID

## 2021-11-22 VITALS
HEART RATE: 79 BPM | RESPIRATION RATE: 18 BRPM | HEIGHT: 65 IN | TEMPERATURE: 98 F | SYSTOLIC BLOOD PRESSURE: 128 MMHG | OXYGEN SATURATION: 99 % | DIASTOLIC BLOOD PRESSURE: 88 MMHG | BODY MASS INDEX: 22.49 KG/M2 | WEIGHT: 135 LBS

## 2021-11-22 DIAGNOSIS — N39.0 URINARY TRACT INFECTION WITHOUT HEMATURIA, SITE UNSPECIFIED: ICD-10-CM

## 2021-11-22 DIAGNOSIS — M54.9 BACK PAIN WITHOUT RADIATION: Primary | ICD-10-CM

## 2021-11-22 PROCEDURE — 99283 EMERGENCY DEPT VISIT LOW MDM: CPT

## 2021-11-22 PROCEDURE — 87086 URINE CULTURE/COLONY COUNT: CPT | Performed by: EMERGENCY MEDICINE

## 2021-11-22 PROCEDURE — 81001 URINALYSIS AUTO W/SCOPE: CPT | Performed by: EMERGENCY MEDICINE

## 2021-11-22 PROCEDURE — 81015 MICROSCOPIC EXAM OF URINE: CPT | Performed by: EMERGENCY MEDICINE

## 2021-11-22 PROCEDURE — 99284 EMERGENCY DEPT VISIT MOD MDM: CPT

## 2021-11-22 PROCEDURE — 81025 URINE PREGNANCY TEST: CPT

## 2021-11-22 PROCEDURE — 72110 X-RAY EXAM L-2 SPINE 4/>VWS: CPT | Performed by: EMERGENCY MEDICINE

## 2021-11-22 RX ORDER — CYCLOBENZAPRINE HCL 10 MG
10 TABLET ORAL 3 TIMES DAILY PRN
Qty: 20 TABLET | Refills: 0 | Status: SHIPPED | OUTPATIENT
Start: 2021-11-22

## 2021-11-22 RX ORDER — CEFADROXIL 500 MG/1
500 CAPSULE ORAL 2 TIMES DAILY
Qty: 20 CAPSULE | Refills: 0 | Status: SHIPPED | OUTPATIENT
Start: 2021-11-22 | End: 2021-12-02

## 2021-11-22 NOTE — ED PROVIDER NOTES
Patient Seen in: [facility] Emergency Department In [place]      History   Patient presents with:  Back Pain    Stated Complaint: left lower back pain x 1 week    Subjective:   HPI    This is a 41-year-old female who presents with left lower back pain for respiratory distress. HEENT: Atraumatic, conjunctiva are not pale. There is no icterus. Oral mucosa Is wet. No facial trauma. The neck is supple. LUNGS: Clear to auscultation, there is no wheezing or retraction. No crackles.     CV: Cardiovascula was negative. XR LUMBAR SPINE (MIN 4 VIEWS) (CPT=72110)    Result Date: 11/22/2021  PROCEDURE:  XR LUMBAR SPINE (MIN 4 VIEWS) (CPT=72110)  TECHNIQUE:  AP, lateral, oblique, and coned down L5-S1 views were obtained. COMPARISON:  None.   INDICATIONS:  left B GUILLERMO [address] [facility]  [phone]    In 2 days            Medications Prescribed:  Current Discharge Medication List    START taking these medications    cefadroxil 500 MG Oral Cap  Take 1 capsule (500 mg total) by mouth 2 (two) times daily for

## 2021-11-23 ENCOUNTER — TELEPHONE (OUTPATIENT)
Dept: FAMILY MEDICINE CLINIC | Facility: CLINIC | Age: 36
End: 2021-11-23

## 2021-11-23 NOTE — TELEPHONE ENCOUNTER
Pt was seen in the ER yesterday for back pain. She has a question in regards to the [facility]. Pt states that she is still having back spasms.

## 2021-11-23 NOTE — TELEPHONE ENCOUNTER
Pt states that she was seen at the outpatient clinic for pain in Lower Left back (which she had for 1 week). They said all was ok except she had a UTI. They prescribed Durasef, and a pain killer Flexoral..  She wants to discuss what the possible side effect

## 2021-11-29 ENCOUNTER — TELEPHONE (OUTPATIENT)
Dept: FAMILY MEDICINE CLINIC | Facility: CLINIC | Age: 36
End: 2021-11-29

## 2021-11-29 NOTE — TELEPHONE ENCOUNTER
Patient calling, patient went to ER on 11-22. Patient had x-ray done, prescribed medication.  Patient had will like to discuss results, please advise

## 2021-11-30 ENCOUNTER — OFFICE VISIT (OUTPATIENT)
Dept: FAMILY MEDICINE CLINIC | Facility: CLINIC | Age: 36
End: 2021-11-30
Payer: MEDICAID

## 2021-11-30 VITALS
OXYGEN SATURATION: 98 % | DIASTOLIC BLOOD PRESSURE: 70 MMHG | BODY MASS INDEX: 22.66 KG/M2 | WEIGHT: 136 LBS | HEIGHT: 65 IN | TEMPERATURE: 98 F | RESPIRATION RATE: 16 BRPM | SYSTOLIC BLOOD PRESSURE: 120 MMHG | HEART RATE: 78 BPM

## 2021-11-30 DIAGNOSIS — Z71.1 NO PROBLEM, FEARED COMPLAINT UNFOUNDED: ICD-10-CM

## 2021-11-30 DIAGNOSIS — M54.50 ACUTE LOW BACK PAIN WITHOUT SCIATICA, UNSPECIFIED BACK PAIN LATERALITY: Primary | ICD-10-CM

## 2021-11-30 DIAGNOSIS — K59.00 CONSTIPATION, UNSPECIFIED CONSTIPATION TYPE: ICD-10-CM

## 2021-11-30 PROCEDURE — 3074F SYST BP LT 130 MM HG: CPT | Performed by: FAMILY MEDICINE

## 2021-11-30 PROCEDURE — 3008F BODY MASS INDEX DOCD: CPT | Performed by: FAMILY MEDICINE

## 2021-11-30 PROCEDURE — 99214 OFFICE O/P EST MOD 30 MIN: CPT | Performed by: FAMILY MEDICINE

## 2021-11-30 PROCEDURE — 3078F DIAST BP <80 MM HG: CPT | Performed by: FAMILY MEDICINE

## 2021-11-30 NOTE — PATIENT INSTRUCTIONS
Exercises to Strengthen Your Lower Back  Strong lower back and abdominal muscles work together to support your spine. The exercises below will help strengthen the lower back. It's important that you start exercising slowly and increase levels gradually. side.  Standin. Wall squats. Stand with your back against the wall. Move your feet about 12 inches away from the wall. Tighten your stomach muscles, and slowly bend your knees until they are at about a 45 degree angle. Don't go down too far.  Hold abou Keep your head in line with your body (don’t bend your neck forward). Hold for 2 seconds, then slowly lower. Vaughn last reviewed this educational content on 8/1/2019  © 9574-6852 [employer]. All rights reserved.  This information is not i

## 2021-11-30 NOTE — TELEPHONE ENCOUNTER
Future Appointments   Date Time Provider Kole Rita   12/1/2021  8:20 AM Ayaka Buck MD [facility] [address] [facility] [address]   12/15/2021  8:40 AM Ayaka Buck MD [facility] [address] [facility] [address]   12/16/2021 10:30 AM [facility]

## 2021-11-30 NOTE — PROGRESS NOTES
[facility] Visit Note  11/30/2021      Subjective:      Patient ID: Fatoumata Flores is a 43year old female. Chief Complaint:  Patient presents with:  ER F/U: on 11/22/21 for left low back pain/left abdomen/left flank.  Today c/o still having dandruff, no nits or lice appreciated      Assessment:     1. Acute low back pain without sciatica, unspecified back pain laterality  -tylenol/ibuprofen prn, exercises given    2.  Constipation, unspecified constipation type  -advised increasing the miralax

## 2021-12-05 ENCOUNTER — TELEPHONE (OUTPATIENT)
Dept: FAMILY MEDICINE CLINIC | Facility: CLINIC | Age: 36
End: 2021-12-05

## 2021-12-05 NOTE — TELEPHONE ENCOUNTER
Liliane Morris PA-C on call for Dr Alva[place]ga [place] on 12/5/21 at 10:15 am.  C/O cough, fever, ST diagnosed positive COVID 19 at [employer] today. Smell and taste gone. No significant shortness of breath or CP. Hydrating well.   Started symptoms 12/2/21   Not va

## 2021-12-08 ENCOUNTER — PATIENT MESSAGE (OUTPATIENT)
Dept: FAMILY MEDICINE CLINIC | Facility: CLINIC | Age: 36
End: 2021-12-08

## 2021-12-08 NOTE — TELEPHONE ENCOUNTER
From: Yeimi Crow  To: Nora Salter MD  Sent: 12/8/2021 12:17 PM CST  Subject: Covid symptoms not improving      Hello,    I was tested positive for Covid on Friday December 3rd. I have not gotten better yet at all.  I am coughing non stop with

## 2021-12-09 ENCOUNTER — TELEPHONE (OUTPATIENT)
Dept: FAMILY MEDICINE CLINIC | Facility: CLINIC | Age: 36
End: 2021-12-09

## 2021-12-09 NOTE — TELEPHONE ENCOUNTER
Pt calling to state that she was Covid positive as of last week, and she is requesting a video visit with the doctor but there are no openings. Symptoms include: Fever, chills, weakness, coughing, throat on fire, loss of smell and taste.  All over the count

## 2021-12-15 ENCOUNTER — TELEPHONE (OUTPATIENT)
Dept: GASTROENTEROLOGY | Facility: CLINIC | Age: 36
End: 2021-12-15

## 2021-12-15 NOTE — TELEPHONE ENCOUNTER
Noted patient rescheduled for appt on 1/4/21 at 9 AM ne.     Your appointments     Date & Time Appointment Department [facility])        Jan 04, 2022  9:00 AM CST Follow Up Visit with Jeaneth Pritchett MD [facility], [facility], [address] [place] [address],[address]

## 2021-12-15 NOTE — TELEPHONE ENCOUNTER
Pt is returning the call stating that Jan 4th at [address] [place] [address] works better for the appt.  Please follow up

## 2021-12-15 NOTE — TELEPHONE ENCOUNTER
Left message for patient to call back. Per Dr. Kaplan Situ patient needs to reschedule appointment for tomorrow due to recent Covid positive result. Needs to be at least 14 days from positive result.     If patient calls back will offer patient appt on 1/4

## 2021-12-16 ENCOUNTER — PATIENT MESSAGE (OUTPATIENT)
Dept: FAMILY MEDICINE CLINIC | Facility: CLINIC | Age: 36
End: 2021-12-16

## 2021-12-16 DIAGNOSIS — R10.32 LEFT LOWER QUADRANT ABDOMINAL PAIN: Primary | ICD-10-CM

## 2021-12-16 DIAGNOSIS — K59.09 CHRONIC CONSTIPATION: ICD-10-CM

## 2021-12-16 NOTE — TELEPHONE ENCOUNTER
Last OV 11/30/21  2.  Constipation, unspecified constipation type  -advised increasing the miralax to at least 2d/wk and adjust according to sxs as I feel her Lsided abdominal pain is related to this still    Please advise

## 2021-12-16 NOTE — TELEPHONE ENCOUNTER
From: Sarah Deal  To: Devon Greene MD  Sent: 12/16/2021 7:48 AM CST  Subject: Issues with abdomen pain      Good morning,    During my in person visit a couple weeks ago, I mentioned I have been experiencing pain throughout my abdomen.  Over t

## 2021-12-27 ENCOUNTER — PATIENT MESSAGE (OUTPATIENT)
Dept: FAMILY MEDICINE CLINIC | Facility: CLINIC | Age: 36
End: 2021-12-27

## 2021-12-27 ENCOUNTER — HOSPITAL ENCOUNTER (OUTPATIENT)
Dept: GENERAL RADIOLOGY | Age: 36
Discharge: HOME | End: 2021-12-27
Attending: FAMILY MEDICINE
Payer: MEDICAID

## 2021-12-27 DIAGNOSIS — K59.04 CHRONIC IDIOPATHIC CONSTIPATION: Primary | ICD-10-CM

## 2021-12-27 DIAGNOSIS — K59.09 CHRONIC CONSTIPATION: ICD-10-CM

## 2021-12-27 DIAGNOSIS — R10.32 LEFT LOWER QUADRANT ABDOMINAL PAIN: ICD-10-CM

## 2021-12-27 PROCEDURE — 74018 RADEX ABDOMEN 1 VIEW: CPT | Performed by: FAMILY MEDICINE

## 2021-12-28 NOTE — TELEPHONE ENCOUNTER
From: Garett Sharif  To: Louise Palma MD  Sent: 12/27/2021 12:30 PM CST  Subject: X-ray tests results       Hi Dr Susu Dempsey you had a good Sudhir/Holiday. I had my test done this morning and read my results.  I wanted to know if the X-r

## 2021-12-30 NOTE — PROGRESS NOTES
[facility], [facility] - Gastroenterology                                                                                                  Clinic Progress Note    Patient pr Medications, Allergies, ROS:      Past Medical History:   Diagnosis Date   • Anxiety    • Chronic constipation    • COVID-19 virus infection 12/02/2021    + test on 12/5/21, per pt, no results in system   • Depression    • IBS (irritable bowel syndrome) and without nodules  CV: RRR  Resp: non-labored breathing  Abd: soft, non-tender, non-distended  Ext: no lower extremity swelling  Neuro: Alert, Oriented X 3  Skin: no rashes, bruises  Psych: normal affect    Labs/Imaging:     Reviewed as noted in the HPI idiopathic constipation which we discussed and initial management including increase kiwi in the diet, fiber supplement, acetaminophen prn.  Discussed consideration of further evaluation of the abnormality on CT with possibilities including colonoscopy, fle

## 2022-01-04 ENCOUNTER — OFFICE VISIT (OUTPATIENT)
Dept: GASTROENTEROLOGY | Facility: CLINIC | Age: 37
End: 2022-01-04
Payer: MEDICAID

## 2022-01-04 VITALS
HEIGHT: 65 IN | BODY MASS INDEX: 22.66 KG/M2 | SYSTOLIC BLOOD PRESSURE: 110 MMHG | DIASTOLIC BLOOD PRESSURE: 78 MMHG | WEIGHT: 136 LBS

## 2022-01-04 DIAGNOSIS — K59.00 CONSTIPATION, UNSPECIFIED CONSTIPATION TYPE: Primary | ICD-10-CM

## 2022-01-04 DIAGNOSIS — R10.9 ABDOMINAL DISCOMFORT: ICD-10-CM

## 2022-01-04 DIAGNOSIS — K58.1 IRRITABLE BOWEL SYNDROME WITH CONSTIPATION: ICD-10-CM

## 2022-01-04 PROCEDURE — 3078F DIAST BP <80 MM HG: CPT | Performed by: INTERNAL MEDICINE

## 2022-01-04 PROCEDURE — 3074F SYST BP LT 130 MM HG: CPT | Performed by: INTERNAL MEDICINE

## 2022-01-04 PROCEDURE — 99214 OFFICE O/P EST MOD 30 MIN: CPT | Performed by: INTERNAL MEDICINE

## 2022-01-04 PROCEDURE — 3008F BODY MASS INDEX DOCD: CPT | Performed by: INTERNAL MEDICINE

## 2022-01-04 RX ORDER — DICYCLOMINE HYDROCHLORIDE 10 MG/1
10 CAPSULE ORAL 3 TIMES DAILY PRN
Qty: 60 CAPSULE | Refills: 1 | Status: SHIPPED | OUTPATIENT
Start: 2022-01-04

## 2022-01-04 NOTE — PATIENT INSTRUCTIONS
1. Continue on generic miralax, metamucil for your constipation    2. If it has been a few days or more since bowel movements and you're feeling constipated, try a senna medication or tea over the counter    3. For the discomfort, continue IBGard    4.  I'v

## 2022-02-03 RX ORDER — ALPRAZOLAM 0.25 MG/1
0.25 TABLET ORAL DAILY PRN
Qty: 30 TABLET | Refills: 0 | Status: SHIPPED | OUTPATIENT
Start: 2022-02-03 | End: 2022-03-08

## 2022-02-03 NOTE — TELEPHONE ENCOUNTER
Requesting Alprazolam 0.25mg  LOV: 11/30/21  RTC: prn  Last Relevant Labs: 1/28/21  Filled: 11/15/21 #30 with 0 refills    Future Appointments   Date Time Provider [facility] [place]   2/7/2022 10:00 AM Laura Ballard MD [facility] [address] [facility]   3/10/2022  2:00 PM Garth Narayan MD [facility] , last dispensed 11/15/21 #30    Rx pended and routed for approval/denial

## 2022-02-19 ENCOUNTER — OFFICE VISIT (OUTPATIENT)
Dept: FAMILY MEDICINE CLINIC | Facility: CLINIC | Age: 37
End: 2022-02-19
Payer: MEDICAID

## 2022-02-19 VITALS
OXYGEN SATURATION: 99 % | TEMPERATURE: 97 F | HEIGHT: 65 IN | HEART RATE: 94 BPM | SYSTOLIC BLOOD PRESSURE: 132 MMHG | DIASTOLIC BLOOD PRESSURE: 64 MMHG | BODY MASS INDEX: 22.49 KG/M2 | WEIGHT: 135 LBS | RESPIRATION RATE: 18 BRPM

## 2022-02-19 DIAGNOSIS — R39.9 UTI SYMPTOMS: Primary | ICD-10-CM

## 2022-02-19 LAB
APPEARANCE: CLEAR
BILIRUBIN: NEGATIVE
GLUCOSE (URINE DIPSTICK): NEGATIVE MG/DL
KETONES (URINE DIPSTICK): NEGATIVE MG/DL
MULTISTIX LOT#: ABNORMAL NUMERIC
NITRITE, URINE: NEGATIVE
PH, URINE: 6 (ref 4.5–8)
PROTEIN (URINE DIPSTICK): NEGATIVE MG/DL
SPECIFIC GRAVITY: 1.01 (ref 1–1.03)
URINE-COLOR: YELLOW
UROBILINOGEN,SEMI-QN: 0.2 MG/DL (ref 0–1.9)

## 2022-02-19 PROCEDURE — 3008F BODY MASS INDEX DOCD: CPT | Performed by: NURSE PRACTITIONER

## 2022-02-19 PROCEDURE — 87086 URINE CULTURE/COLONY COUNT: CPT | Performed by: NURSE PRACTITIONER

## 2022-02-19 PROCEDURE — 3075F SYST BP GE 130 - 139MM HG: CPT | Performed by: NURSE PRACTITIONER

## 2022-02-19 PROCEDURE — 81003 URINALYSIS AUTO W/O SCOPE: CPT | Performed by: NURSE PRACTITIONER

## 2022-02-19 PROCEDURE — 3078F DIAST BP <80 MM HG: CPT | Performed by: NURSE PRACTITIONER

## 2022-02-19 PROCEDURE — 99213 OFFICE O/P EST LOW 20 MIN: CPT | Performed by: NURSE PRACTITIONER

## 2022-02-19 RX ORDER — NITROFURANTOIN 25; 75 MG/1; MG/1
100 CAPSULE ORAL 2 TIMES DAILY
Qty: 10 CAPSULE | Refills: 0 | Status: SHIPPED | OUTPATIENT
Start: 2022-02-19 | End: 2022-02-24

## 2022-02-21 ENCOUNTER — TELEPHONE (OUTPATIENT)
Dept: FAMILY MEDICINE CLINIC | Facility: CLINIC | Age: 37
End: 2022-02-21

## 2022-02-21 NOTE — TELEPHONE ENCOUNTER
Spoke with pt and reviewed urine culture results, pt verbalized understanding. Pt states yesterday she was still experiencing some blood in her urine but states that has resolved today. Pt states she is feeling better today, advised pt to complete course of antibiotics. Pt had started taking some antibiotics on hand prior to being evaluated at the [facility] which could have contributed to the negative urine culture.

## 2022-02-26 ENCOUNTER — OFFICE VISIT (OUTPATIENT)
Dept: FAMILY MEDICINE CLINIC | Facility: CLINIC | Age: 37
End: 2022-02-26
Payer: MEDICAID

## 2022-02-26 VITALS
RESPIRATION RATE: 18 BRPM | BODY MASS INDEX: 23.32 KG/M2 | DIASTOLIC BLOOD PRESSURE: 68 MMHG | SYSTOLIC BLOOD PRESSURE: 118 MMHG | HEART RATE: 75 BPM | HEIGHT: 65 IN | WEIGHT: 140 LBS | TEMPERATURE: 98 F | OXYGEN SATURATION: 100 %

## 2022-02-26 DIAGNOSIS — R31.9 HEMATURIA, UNSPECIFIED TYPE: Primary | ICD-10-CM

## 2022-02-26 LAB
APPEARANCE: CLEAR
BILIRUBIN: NEGATIVE
KETONES (URINE DIPSTICK): NEGATIVE MG/DL
LEUKOCYTES: NEGATIVE
MULTISTIX LOT#: ABNORMAL NUMERIC
NITRITE, URINE: NEGATIVE
PH, URINE: 6 (ref 4.5–8)
PROTEIN (URINE DIPSTICK): NEGATIVE MG/DL
SPECIFIC GRAVITY: 1.02 (ref 1–1.03)
URINE-COLOR: YELLOW
UROBILINOGEN,SEMI-QN: 0.2 MG/DL (ref 0–1.9)

## 2022-02-26 PROCEDURE — 81003 URINALYSIS AUTO W/O SCOPE: CPT | Performed by: NURSE PRACTITIONER

## 2022-02-26 PROCEDURE — 3008F BODY MASS INDEX DOCD: CPT | Performed by: NURSE PRACTITIONER

## 2022-02-26 PROCEDURE — 99213 OFFICE O/P EST LOW 20 MIN: CPT | Performed by: NURSE PRACTITIONER

## 2022-02-26 PROCEDURE — 3074F SYST BP LT 130 MM HG: CPT | Performed by: NURSE PRACTITIONER

## 2022-02-26 PROCEDURE — 3078F DIAST BP <80 MM HG: CPT | Performed by: NURSE PRACTITIONER

## 2022-03-08 DIAGNOSIS — F41.0 PANIC ATTACK: ICD-10-CM

## 2022-03-08 DIAGNOSIS — R11.0 NAUSEA: ICD-10-CM

## 2022-03-08 DIAGNOSIS — F33.1 MODERATE EPISODE OF RECURRENT MAJOR DEPRESSIVE DISORDER (HCC): ICD-10-CM

## 2022-03-08 DIAGNOSIS — F40.00 AGORAPHOBIA: ICD-10-CM

## 2022-03-08 RX ORDER — ALPRAZOLAM 0.25 MG/1
0.25 TABLET ORAL DAILY PRN
Qty: 30 TABLET | Refills: 0 | Status: SHIPPED | OUTPATIENT
Start: 2022-03-08 | End: 2022-05-04

## 2022-03-08 RX ORDER — FLUOXETINE 10 MG/1
10 TABLET, FILM COATED ORAL DAILY
Qty: 30 TABLET | Refills: 0 | Status: SHIPPED | OUTPATIENT
Start: 2022-03-08 | End: 2022-04-10

## 2022-03-08 RX ORDER — ONDANSETRON 4 MG/1
4 TABLET, ORALLY DISINTEGRATING ORAL EVERY 8 HOURS PRN
Qty: 30 TABLET | Refills: 0 | Status: SHIPPED | OUTPATIENT
Start: 2022-03-08 | End: 2022-09-27

## 2022-03-08 NOTE — TELEPHONE ENCOUNTER
Requesting Ondansetron 4mg  LOV: 2/7/22  RTC: 1 week  Last Relevant Labs: 1/28/21  Filled: 11/15/21 #30 with 0 refills    Requesting Alprazolam 0.25mg  LOV: 2/7/22  RTC: 1 week  Last Relevant Labs: 1/28/21  Filled: 2/3/22 #30 with 0 refills    Requesting Fluoxetine 10mg  LOV: 2/7/22  RTC: 1 week  Last Relevant Labs: 1/28/21  Filled: 2/7/22 #30 with 0 refills    Future Appointments   Date Time Provider Kole [place]   3/10/2022  2:00 PM Kavita Arenas MD [facility] [address] [facility] pended and routed for approval/denial

## 2022-03-10 ENCOUNTER — OFFICE VISIT (OUTPATIENT)
Dept: GASTROENTEROLOGY | Facility: CLINIC | Age: 37
End: 2022-03-10
Payer: MEDICAID

## 2022-03-10 ENCOUNTER — TELEPHONE (OUTPATIENT)
Dept: GASTROENTEROLOGY | Facility: CLINIC | Age: 37
End: 2022-03-10

## 2022-03-10 VITALS
DIASTOLIC BLOOD PRESSURE: 84 MMHG | WEIGHT: 142 LBS | SYSTOLIC BLOOD PRESSURE: 110 MMHG | BODY MASS INDEX: 23.66 KG/M2 | HEIGHT: 65 IN

## 2022-03-10 DIAGNOSIS — K62.5 RECTAL BLEEDING: Primary | ICD-10-CM

## 2022-03-10 DIAGNOSIS — K58.1 IRRITABLE BOWEL SYNDROME WITH CONSTIPATION: ICD-10-CM

## 2022-03-10 DIAGNOSIS — K59.04 CHRONIC IDIOPATHIC CONSTIPATION: ICD-10-CM

## 2022-03-10 PROCEDURE — 3008F BODY MASS INDEX DOCD: CPT | Performed by: INTERNAL MEDICINE

## 2022-03-10 PROCEDURE — 3079F DIAST BP 80-89 MM HG: CPT | Performed by: INTERNAL MEDICINE

## 2022-03-10 PROCEDURE — 3074F SYST BP LT 130 MM HG: CPT | Performed by: INTERNAL MEDICINE

## 2022-03-10 PROCEDURE — 99214 OFFICE O/P EST MOD 30 MIN: CPT | Performed by: INTERNAL MEDICINE

## 2022-03-10 RX ORDER — LINACLOTIDE 145 UG/1
1 CAPSULE, GELATIN COATED ORAL DAILY
Qty: 90 CAPSULE | Refills: 3 | Status: SHIPPED | OUTPATIENT
Start: 2022-03-10 | End: 2022-04-09

## 2022-03-10 NOTE — PATIENT INSTRUCTIONS
Colonoscopy  1. Schedule colonoscopy with monitored anesthesia care (MAC) with Dr. Stephanie Quintero [dx: rectal bleeding]    2.  bowel prep from pharmacy (split trilyte or golytely). As we discussed it is important to take the bowel preparation in two parts taking 2L of the liquid the night before the procedure and the second 2L the morning of the procedure starting approximately 6 hours prior to your scheduled procedure time. 3. Continue all medications for procedure    4. Read all bowel prep instructions carefully    5. AVOID seeds, nuts, popcorn, raw fruits and vegetables (cooked is okay) for 2-3 days before procedure    >>>Please note: if you were prescribed a bowel prep and it is too expensive or not covered by insurance, it is okay to substitute Trilyte or Golytely (or any similar generic prep). This can be done by notifying the pharmacy or calling our office. 6. You will also need to get tested for COVID within 72 hours prior to the procedure. You should be contacted regarding the instructions for this.     Constipation  I've prescribed linzess/linaclotide

## 2022-03-10 NOTE — TELEPHONE ENCOUNTER
Scheduled for:  Colonoscopy 22137  Provider Name:  Dr. Mela Ray  Date:  5/23/2022  Location:  [facility]  Sedation:  MAC  Time:  2:15 pm, arrival 1:15 pm  Prep:  Golytely  Meds/Allergies Reconciled?:  Physician reviewed  Diagnosis with codes:  Rectal bleeding K62.5  Was patient informed to call insurance with codes (Y/N):  Yes  Referral sent?:  Referral was sent at the time of electronic surgical scheduling. [facility] or [address] notified?:  I sent an electronic request to Endo Scheduling and received a confirmation today. Medication Orders:  N/A  Misc Orders:  Patient was informed that they will need a COVID 19 test prior to their procedure. Patient verbally understood & will await a phone call from [facility] to schedule. Further instructions given by staff:  I discussed the prep instructions with the patient which she verbally understood and provided patient with prep instruction sheet.

## 2022-03-22 ENCOUNTER — TELEPHONE (OUTPATIENT)
Dept: GASTROENTEROLOGY | Facility: CLINIC | Age: 37
End: 2022-03-22

## 2022-03-22 ENCOUNTER — TELEMEDICINE (OUTPATIENT)
Dept: FAMILY MEDICINE CLINIC | Facility: CLINIC | Age: 37
End: 2022-03-22

## 2022-03-22 DIAGNOSIS — N92.1 MENORRHAGIA WITH IRREGULAR CYCLE: Primary | ICD-10-CM

## 2022-03-22 DIAGNOSIS — N93.9 ABNORMAL UTERINE BLEEDING (AUB): ICD-10-CM

## 2022-03-22 PROCEDURE — 99214 OFFICE O/P EST MOD 30 MIN: CPT | Performed by: FAMILY MEDICINE

## 2022-03-22 RX ORDER — LEVONORGESTREL AND ETHINYL ESTRADIOL 0.1-0.02MG
1 KIT ORAL DAILY
Qty: 28 TABLET | Refills: 1 | Status: SHIPPED | OUTPATIENT
Start: 2022-03-22 | End: 2023-03-22

## 2022-03-22 RX ORDER — MEDROXYPROGESTERONE ACETATE 5 MG/1
5 TABLET ORAL DAILY
Qty: 10 TABLET | Refills: 0 | Status: SHIPPED | OUTPATIENT
Start: 2022-03-22 | End: 2022-04-01

## 2022-03-22 NOTE — TELEPHONE ENCOUNTER
It should be appealed as I documented in my last outpatient note from 3/10/22 she failed multiple therapeutic classes (ast least 4). Does this need documentation note simply need to be sent. Is it a letter? Is it a peer to peer? Just new prescription with 30 tabs/caps for 30 days?     Thanks    Barbara Foley MD  [facility], [facility] - Gastroenterology  3/22/2022  1:18 PM

## 2022-03-22 NOTE — TELEPHONE ENCOUNTER
Dr. Louise Vega was denied by patient's insurance. Patient must have tried and failed at least 4 standard laxative drug classes. Also prescription was over quantity limit, must be 30 tabs per 30 days. Please advise if you would like to appeal this decision or send alternative medication. Thank you!

## 2022-03-25 NOTE — TELEPHONE ENCOUNTER
Called and contacted [employer] at number given and spoke to representative and gave times I'm available for next week. She notes a form will be filled out and sent to review team and someone should call next week.     Orlando Day MD  [address] [place] - Gastroenterology  3/25/2022  4:30 PM

## 2022-03-30 RX ORDER — LINACLOTIDE 145 UG/1
1 CAPSULE, GELATIN COATED ORAL DAILY
Qty: 30 CAPSULE | Refills: 3 | Status: SHIPPED | OUTPATIENT
Start: 2022-03-30

## 2022-03-30 NOTE — TELEPHONE ENCOUNTER
I was contacted by MD reviewer, Dr. Julio Gay,    We reviewed criteria for linzess which is failure of multiple therapeutic classes as was documented in my note. She was unsure why this was thus an issue as my office note from 3/10/22 documents the multiple agents she has tried and failed. She does note the prescription amount was too much and should be 30 capsules for 30 days. She recommended we fill out prior authorization form again with my office note attached. GI staff:    Please help facilitate this. Does this have to go again to prior auth dept? Should I re-prescribe a prescription with the new pill amount?     Thanks    Peyton Wren MD  [address] [place] - Gastroenterology  3/30/2022  2:13 PM

## 2022-03-30 NOTE — TELEPHONE ENCOUNTER
Dr. Gerri Reece,     Please re-prescribe medication and I can follow up with pharmacy to see if it processes on their end or if another PA needs to be submitted.

## 2022-03-30 NOTE — TELEPHONE ENCOUNTER
New prescription sent    Thanks    Ruby Curry MD  [facility], [facility] - Gastroenterology  3/30/2022  3:30 PM

## 2022-03-31 NOTE — TELEPHONE ENCOUNTER
Kamila from [facility] states that this medication needs medical records including chart notes.  She is going to fax over exactly what is needed for the PA

## 2022-03-31 NOTE — TELEPHONE ENCOUNTER
Xenia, from [facility] requesting all diagnostics codes, please call at [phone], option 4 for Medicaid, please reference [id number], thanks.

## 2022-04-01 ENCOUNTER — HOSPITAL ENCOUNTER (OUTPATIENT)
Age: 37
Discharge: HOME | End: 2022-04-01
Payer: MEDICAID

## 2022-04-01 ENCOUNTER — APPOINTMENT (OUTPATIENT)
Dept: CT IMAGING | Age: 37
End: 2022-04-01
Attending: NURSE PRACTITIONER
Payer: MEDICAID

## 2022-04-01 VITALS
OXYGEN SATURATION: 100 % | DIASTOLIC BLOOD PRESSURE: 88 MMHG | TEMPERATURE: 98 F | RESPIRATION RATE: 12 BRPM | HEART RATE: 80 BPM | SYSTOLIC BLOOD PRESSURE: 132 MMHG

## 2022-04-01 DIAGNOSIS — K59.00 CONSTIPATION, UNSPECIFIED CONSTIPATION TYPE: Primary | ICD-10-CM

## 2022-04-01 LAB
#MXD IC: 0.3 X10ˆ3/UL (ref 0.1–1)
B-HCG UR QL: NEGATIVE
BUN BLD-MCNC: 9 MG/DL (ref 7–18)
CHLORIDE BLD-SCNC: 103 MMOL/L (ref 98–112)
CO2 BLD-SCNC: 25 MMOL/L (ref 21–32)
CREAT BLD-MCNC: 0.8 MG/DL
GLUCOSE BLD-MCNC: 80 MG/DL (ref 70–99)
HCT VFR BLD AUTO: 32.8 %
HCT VFR BLD CALC: 33 %
HGB BLD-MCNC: 10.1 G/DL
ISTAT IONIZED CALCIUM FOR CHEM 8: 1.28 MMOL/L (ref 1.12–1.32)
LYMPHOCYTES # BLD AUTO: 1.2 X10ˆ3/UL (ref 1–4)
LYMPHOCYTES NFR BLD AUTO: 27.5 %
MCH RBC QN AUTO: 25.6 PG (ref 26–34)
MCHC RBC AUTO-ENTMCNC: 30.8 G/DL (ref 31–37)
MCV RBC AUTO: 83.2 FL (ref 80–100)
MIXED CELL %: 7.8 %
NEUTROPHILS # BLD AUTO: 2.7 X10ˆ3/UL (ref 1.5–7.7)
NEUTROPHILS NFR BLD AUTO: 64.7 %
PLATELET # BLD AUTO: 294 X10ˆ3/UL (ref 150–450)
POCT BILIRUBIN URINE: NEGATIVE
POCT BLOOD URINE: NEGATIVE
POCT GLUCOSE URINE: NEGATIVE MG/DL
POCT KETONE URINE: NEGATIVE MG/DL
POCT LEUKOCYTE ESTERASE URINE: NEGATIVE
POCT NITRITE URINE: NEGATIVE
POCT PH URINE: 7 (ref 5–8)
POCT PROTEIN URINE: NEGATIVE MG/DL
POCT SPECIFIC GRAVITY URINE: 1.02
POCT URINE CLARITY: CLEAR
POCT URINE COLOR: YELLOW
POCT UROBILINOGEN URINE: 0.2 MG/DL
POTASSIUM BLD-SCNC: 4.1 MMOL/L (ref 3.6–5.1)
RBC # BLD AUTO: 3.94 X10ˆ6/UL
SODIUM BLD-SCNC: 139 MMOL/L (ref 136–145)
WBC # BLD AUTO: 4.2 X10ˆ3/UL (ref 4–11)

## 2022-04-01 PROCEDURE — 81002 URINALYSIS NONAUTO W/O SCOPE: CPT | Performed by: NURSE PRACTITIONER

## 2022-04-01 PROCEDURE — 99215 OFFICE O/P EST HI 40 MIN: CPT

## 2022-04-01 PROCEDURE — 96360 HYDRATION IV INFUSION INIT: CPT

## 2022-04-01 PROCEDURE — 85025 COMPLETE CBC W/AUTO DIFF WBC: CPT | Performed by: NURSE PRACTITIONER

## 2022-04-01 PROCEDURE — 80047 BASIC METABLC PNL IONIZED CA: CPT

## 2022-04-01 PROCEDURE — 81025 URINE PREGNANCY TEST: CPT

## 2022-04-01 PROCEDURE — 99214 OFFICE O/P EST MOD 30 MIN: CPT

## 2022-04-01 PROCEDURE — 74177 CT ABD & PELVIS W/CONTRAST: CPT | Performed by: NURSE PRACTITIONER

## 2022-04-01 RX ORDER — SODIUM CHLORIDE 9 MG/ML
1000 INJECTION, SOLUTION INTRAVENOUS ONCE
Status: COMPLETED | OUTPATIENT
Start: 2022-04-01 | End: 2022-04-01

## 2022-04-01 RX ORDER — POLYETHYLENE GLYCOL 3350 17 G/17G
17 POWDER, FOR SOLUTION ORAL DAILY PRN
Qty: 12 EACH | Refills: 0 | Status: SHIPPED | OUTPATIENT
Start: 2022-04-01 | End: 2022-05-01

## 2022-04-01 RX ORDER — DOCUSATE SODIUM 100 MG/1
100 CAPSULE, LIQUID FILLED ORAL 2 TIMES DAILY
Qty: 60 CAPSULE | Refills: 0 | Status: SHIPPED | OUTPATIENT
Start: 2022-04-01 | End: 2022-05-01

## 2022-04-01 RX ORDER — IOHEXOL 350 MG/ML
100 INJECTION, SOLUTION INTRAVENOUS
Status: COMPLETED | OUTPATIENT
Start: 2022-04-01 | End: 2022-04-01

## 2022-04-01 NOTE — TELEPHONE ENCOUNTER
Approval received from [address] for Linzess 145mcg. Confirmed with [address] who states medication is fully covered. Patient notified via [address] [place] [address] [address].

## 2022-04-12 DIAGNOSIS — F33.1 MODERATE EPISODE OF RECURRENT MAJOR DEPRESSIVE DISORDER (HCC): ICD-10-CM

## 2022-04-12 DIAGNOSIS — F41.0 PANIC ATTACK: ICD-10-CM

## 2022-04-12 DIAGNOSIS — F40.00 AGORAPHOBIA: ICD-10-CM

## 2022-04-12 RX ORDER — FLUOXETINE 10 MG/1
10 TABLET, FILM COATED ORAL DAILY
Qty: 30 TABLET | Refills: 0 | Status: SHIPPED | OUTPATIENT
Start: 2022-04-12 | End: 2022-04-13

## 2022-04-12 NOTE — TELEPHONE ENCOUNTER
Requesting Fluoxetine 10mg  LOV: 3/22/22  RTC: for annual  Last Relevant Labs:   Filled: 3/8/22 #30 with 0 refills    Future Appointments   Date Time Provider Kole [place]   5/18/2022 10:00 AM Michael Arora MD [facility] [address]   5/23/2022  2:15 PM ADA, PROCEDURE [facility] None     Rx pended and routed for approval/denial

## 2022-04-13 RX ORDER — FLUOXETINE 10 MG/1
10 TABLET, FILM COATED ORAL DAILY
Qty: 30 TABLET | Refills: 0 | Status: SHIPPED | OUTPATIENT
Start: 2022-04-13 | End: 2022-04-29 | Stop reason: ALTCHOICE

## 2022-04-29 ENCOUNTER — TELEMEDICINE (OUTPATIENT)
Dept: FAMILY MEDICINE CLINIC | Facility: CLINIC | Age: 37
End: 2022-04-29

## 2022-04-29 DIAGNOSIS — R51.9 NONINTRACTABLE HEADACHE, UNSPECIFIED CHRONICITY PATTERN, UNSPECIFIED HEADACHE TYPE: Primary | ICD-10-CM

## 2022-04-29 DIAGNOSIS — F33.1 MODERATE EPISODE OF RECURRENT MAJOR DEPRESSIVE DISORDER (HCC): ICD-10-CM

## 2022-04-29 RX ORDER — SERTRALINE HYDROCHLORIDE 25 MG/1
25 TABLET, FILM COATED ORAL DAILY
Qty: 60 TABLET | Refills: 0 | Status: SHIPPED | OUTPATIENT
Start: 2022-04-29

## 2022-05-04 ENCOUNTER — PATIENT MESSAGE (OUTPATIENT)
Dept: FAMILY MEDICINE CLINIC | Facility: CLINIC | Age: 37
End: 2022-05-04

## 2022-05-04 DIAGNOSIS — F40.00 AGORAPHOBIA: ICD-10-CM

## 2022-05-04 DIAGNOSIS — F41.0 PANIC ATTACK: ICD-10-CM

## 2022-05-04 RX ORDER — SUMATRIPTAN 25 MG/1
25 TABLET, FILM COATED ORAL EVERY 2 HOUR PRN
Qty: 9 TABLET | Refills: 0 | Status: SHIPPED | OUTPATIENT
Start: 2022-05-04

## 2022-05-04 RX ORDER — ALPRAZOLAM 0.25 MG/1
0.25 TABLET ORAL DAILY PRN
Qty: 30 TABLET | Refills: 0 | Status: SHIPPED | OUTPATIENT
Start: 2022-05-04 | End: 2022-06-03

## 2022-05-04 NOTE — TELEPHONE ENCOUNTER
Requesting Alprazolam 0.25mg  LOV: 4/29/22 Acute  RTC: 2 months  Last Relevant Labs: 1/28/21  Filled: 3/8/22 #30 with 0 refills    Future Appointments   Date Time Provider Kole [place]   5/18/2022 10:00 AM Caren Quintero MD [facility] OB[facility]N P [facility] [address]   5/23/2022  2:15 PM ADA, PROCEDURE [facility] None     Per [place] , last dispensed 3/8/22 #30    Rx pended and routed for approval/denial

## 2022-05-04 NOTE — TELEPHONE ENCOUNTER
Patient wanted to make sure someone saw this message, she would like to speak to a nurse when available, please advise.

## 2022-05-04 NOTE — TELEPHONE ENCOUNTER
She can try excedrin migraine at the onset to see if this helps. If this is still not helping I can erx sumatriptan 25mg po daily prn headache and may repeat 2 hr later if HA still bad. If her headaches are not improving in the next week to see me.  Also if worsens or other sxs develop dizziness, fever, chills, etc to go to ER

## 2022-05-05 ENCOUNTER — TELEPHONE (OUTPATIENT)
Dept: FAMILY MEDICINE CLINIC | Facility: CLINIC | Age: 37
End: 2022-05-05

## 2022-05-05 NOTE — TELEPHONE ENCOUNTER
Spoke with Vic Gaston at [facility], sig should be \"take one tablet by mouth. Can repeat in 2 hours if headache is still bad\". Vic Gaston verbalized understanding and agreement. All questions answered.

## 2022-05-07 ENCOUNTER — HOSPITAL ENCOUNTER (OUTPATIENT)
Age: 37
Discharge: ANOTHER HOSPITAL | End: 2022-05-07
Attending: EMERGENCY MEDICINE
Payer: MEDICAID

## 2022-05-07 VITALS
DIASTOLIC BLOOD PRESSURE: 85 MMHG | WEIGHT: 142 LBS | OXYGEN SATURATION: 97 % | SYSTOLIC BLOOD PRESSURE: 122 MMHG | RESPIRATION RATE: 18 BRPM | TEMPERATURE: 98 F | HEART RATE: 93 BPM | HEIGHT: 65 IN | BODY MASS INDEX: 23.66 KG/M2

## 2022-05-07 DIAGNOSIS — R51.9 ACUTE INTRACTABLE HEADACHE, UNSPECIFIED HEADACHE TYPE: Primary | ICD-10-CM

## 2022-05-07 PROCEDURE — 99213 OFFICE O/P EST LOW 20 MIN: CPT

## 2022-05-07 NOTE — ED INITIAL ASSESSMENT (HPI)
Patient reports she has had a non stop headache for a week or so. Patient reports it had been on and off for about 1 month, but in the last couple of weeks has been more severe. Patient reports she has never had a headache like this before.

## 2022-05-08 ENCOUNTER — APPOINTMENT (OUTPATIENT)
Dept: CT IMAGING | Age: 37
End: 2022-05-08
Attending: EMERGENCY MEDICINE
Payer: MEDICAID

## 2022-05-08 ENCOUNTER — HOSPITAL ENCOUNTER (EMERGENCY)
Age: 37
Discharge: HOME | End: 2022-05-08
Attending: EMERGENCY MEDICINE
Payer: MEDICAID

## 2022-05-08 VITALS
WEIGHT: 142 LBS | OXYGEN SATURATION: 100 % | TEMPERATURE: 98 F | SYSTOLIC BLOOD PRESSURE: 122 MMHG | HEART RATE: 74 BPM | BODY MASS INDEX: 23.66 KG/M2 | DIASTOLIC BLOOD PRESSURE: 78 MMHG | RESPIRATION RATE: 16 BRPM | HEIGHT: 65 IN

## 2022-05-08 DIAGNOSIS — R51.9 RECURRENT HEADACHE: Primary | ICD-10-CM

## 2022-05-08 LAB — B-HCG UR QL: NEGATIVE

## 2022-05-08 PROCEDURE — 96374 THER/PROPH/DIAG INJ IV PUSH: CPT

## 2022-05-08 PROCEDURE — 70450 CT HEAD/BRAIN W/O DYE: CPT | Performed by: EMERGENCY MEDICINE

## 2022-05-08 PROCEDURE — 99284 EMERGENCY DEPT VISIT MOD MDM: CPT

## 2022-05-08 PROCEDURE — 96375 TX/PRO/DX INJ NEW DRUG ADDON: CPT

## 2022-05-08 PROCEDURE — 81025 URINE PREGNANCY TEST: CPT

## 2022-05-08 RX ORDER — DIPHENHYDRAMINE HYDROCHLORIDE 50 MG/ML
25 INJECTION INTRAMUSCULAR; INTRAVENOUS ONCE
Status: COMPLETED | OUTPATIENT
Start: 2022-05-08 | End: 2022-05-08

## 2022-05-08 RX ORDER — BUTALBITAL, ASPIRIN, AND CAFFEINE 50; 325; 40 MG/1; MG/1; MG/1
1-2 CAPSULE ORAL EVERY 6 HOURS PRN
Qty: 10 CAPSULE | Refills: 0 | Status: SHIPPED | OUTPATIENT
Start: 2022-05-08 | End: 2022-05-13

## 2022-05-08 RX ORDER — KETOROLAC TROMETHAMINE 30 MG/ML
30 INJECTION, SOLUTION INTRAMUSCULAR; INTRAVENOUS ONCE
Status: COMPLETED | OUTPATIENT
Start: 2022-05-08 | End: 2022-05-08

## 2022-05-08 RX ORDER — METOCLOPRAMIDE HYDROCHLORIDE 5 MG/ML
10 INJECTION INTRAMUSCULAR; INTRAVENOUS ONCE
Status: COMPLETED | OUTPATIENT
Start: 2022-05-08 | End: 2022-05-08

## 2022-05-08 NOTE — ED INITIAL ASSESSMENT (HPI)
Pt has had a migraine headache since last week. Went to [facility] yesterday and ct was down told to come here if not better.

## 2022-05-11 ENCOUNTER — PATIENT MESSAGE (OUTPATIENT)
Dept: GASTROENTEROLOGY | Facility: CLINIC | Age: 37
End: 2022-05-11

## 2022-05-11 NOTE — TELEPHONE ENCOUNTER
From: Jeannie Gonsalez  To: Rayfield Lanes, MD  Sent: 5/11/2022 7:20 AM CDT  Subject: Jo-Ann Hooker my scheduled procedure       I have a scheduled colonoscopy for Monday, May 23rd. I am going to have to unfortunately cancel. To be honest I am not well emotionally and mentally (anxiety) to go through the procedure at this time. I just want to know if there is any other tests such as a cat or PET scan to view inside the stomach/colon without going through the extreme preparations before the colonoscopy? Again, I truly apologize for the inconvenience.        Dom Wan

## 2022-05-11 NOTE — TELEPHONE ENCOUNTER
Dr. La Headings,    Please see message below. Patient would like to cancel currently scheduled colonoscopy but is inquiring if another test can be done. Please advise, thank you!

## 2022-05-16 ENCOUNTER — TELEPHONE (OUTPATIENT)
Dept: GASTROENTEROLOGY | Facility: CLINIC | Age: 37
End: 2022-05-16

## 2022-05-16 ENCOUNTER — PATIENT MESSAGE (OUTPATIENT)
Dept: FAMILY MEDICINE CLINIC | Facility: CLINIC | Age: 37
End: 2022-05-16

## 2022-05-16 NOTE — TELEPHONE ENCOUNTER
Video 5/13/22  Assessment/Plan:  1. Moderate episode of recurrent major depressive disorder (HCC)  -to stop fluoxetine and start sertraline  -discussed unlikelihood of going through withdrawal due to long half life of fluoxetine and still being on an SSRI. -pt willing to change to sertraline now.       Please advise

## 2022-05-16 NOTE — TELEPHONE ENCOUNTER
Procedure cancelled in Epic. Surgical case change request sent. GI schedulers, please assist patient with rescheduling. Thank you.

## 2022-05-16 NOTE — TELEPHONE ENCOUNTER
Please let her know thank you for the message and letting me know. There should be available Friday morning in that time frame at the hospital. Please let her know it would probably be best to schedule sooner than later for then.     Thanks    Andrew Callejas MD  [address] [facility] - Gastroenterology  5/16/2022  9:06 AM

## 2022-05-16 NOTE — TELEPHONE ENCOUNTER
From: Shane Britton  To: Nicholaus Bosworth, MD  Sent: 5/16/2022 8:47 AM CDT  Subject: Reaction to Zoloft medication       Good morning Dr. Andres Bee,    I started taking the Zoloft medication on Saturday. Unfortunately I am having a terrible reaction. I am experiencing dizzy spells and shakiness and feel terrible. This happens not to long after I take the medication. Plus I was notified by my Mother that I had a bad reaction many years ago, similar to what I am going through now when taking the Zoloft. I would rather go back and stick to Prozac. I was doing overall great with the medication minus the headaches issues which I am willing to deal with it. Can I request a refill of the 10MG of Prozac?

## 2022-05-17 NOTE — TELEPHONE ENCOUNTER
Although fluoxetine is helping, if it's causing the migraines, it's  Not appropriate and needs a different alternative. Please have her start taking venlafaxine daily. It works on serotonin like the fluoxetine did also works with norepinephrine receptors to help the mood. It can help with both anxiety and depression and also is used for migraine prevention. See me in 1 month from starting the venlafaxine.

## 2022-05-18 ENCOUNTER — PATIENT MESSAGE (OUTPATIENT)
Dept: FAMILY MEDICINE CLINIC | Facility: CLINIC | Age: 37
End: 2022-05-18

## 2022-05-18 RX ORDER — VENLAFAXINE HYDROCHLORIDE 37.5 MG/1
37.5 CAPSULE, EXTENDED RELEASE ORAL DAILY
Qty: 30 CAPSULE | Refills: 0 | Status: SHIPPED | OUTPATIENT
Start: 2022-05-18 | End: 2022-05-20 | Stop reason: ALTCHOICE

## 2022-05-18 NOTE — TELEPHONE ENCOUNTER
From: Sarai Kauffman  To: Candi Leiva MD  Sent: 5/18/2022 11:45 AM CDT  Subject: Requesting refill of 10MG Prozac medication     Hi Dr. Cata Perry you are doing well. I know you a busy with patients, I sent you a message the other day ago regarding my bad reaction with the Zoloft medication. I do want to be put back on the 10MG of Prozac again. I did so much better with the medication minus the headaches, which I am more than willing to tolerate it. If you get a chance, I ask if you can please request a refill of the Prozac medication. I apologize for the inconvenience and intrusive messaging. Thank you.       Mihaela Garibay

## 2022-05-20 ENCOUNTER — PATIENT MESSAGE (OUTPATIENT)
Dept: FAMILY MEDICINE CLINIC | Facility: CLINIC | Age: 37
End: 2022-05-20

## 2022-05-20 DIAGNOSIS — F40.00 AGORAPHOBIA: ICD-10-CM

## 2022-05-20 DIAGNOSIS — F33.1 MODERATE EPISODE OF RECURRENT MAJOR DEPRESSIVE DISORDER (HCC): ICD-10-CM

## 2022-05-20 DIAGNOSIS — F41.0 PANIC ATTACK: ICD-10-CM

## 2022-05-20 RX ORDER — FLUOXETINE 10 MG/1
10 TABLET, FILM COATED ORAL DAILY
Qty: 90 TABLET | Refills: 0 | Status: SHIPPED | OUTPATIENT
Start: 2022-05-20

## 2022-06-03 DIAGNOSIS — F40.00 AGORAPHOBIA: ICD-10-CM

## 2022-06-03 DIAGNOSIS — F41.0 PANIC ATTACK: ICD-10-CM

## 2022-06-07 DIAGNOSIS — F41.0 PANIC ATTACK: ICD-10-CM

## 2022-06-07 DIAGNOSIS — F40.00 AGORAPHOBIA: ICD-10-CM

## 2022-06-07 RX ORDER — ALPRAZOLAM 0.25 MG/1
0.25 TABLET ORAL DAILY PRN
Qty: 30 TABLET | Refills: 0 | Status: CANCELLED | OUTPATIENT
Start: 2022-06-07

## 2022-06-07 RX ORDER — ALPRAZOLAM 0.25 MG/1
0.25 TABLET ORAL DAILY PRN
Qty: 30 TABLET | Refills: 0 | Status: SHIPPED | OUTPATIENT
Start: 2022-06-07

## 2022-06-30 ENCOUNTER — HOSPITAL ENCOUNTER (OUTPATIENT)
Age: 37
Discharge: LEFT BEFORE DISCHARGE | End: 2022-06-30
Payer: MEDICAID

## 2022-06-30 NOTE — ED NOTES
Pt called in [address] [facility] cell phone. No answer.   Will recall in a couple of minutes

## 2022-07-13 DIAGNOSIS — F40.00 AGORAPHOBIA: ICD-10-CM

## 2022-07-13 DIAGNOSIS — F41.0 PANIC ATTACK: ICD-10-CM

## 2022-07-13 RX ORDER — ALPRAZOLAM 0.25 MG/1
0.25 TABLET ORAL DAILY PRN
Qty: 30 TABLET | Refills: 0 | Status: SHIPPED | OUTPATIENT
Start: 2022-07-13

## 2022-08-16 DIAGNOSIS — F41.0 PANIC ATTACK: ICD-10-CM

## 2022-08-16 DIAGNOSIS — F40.00 AGORAPHOBIA: ICD-10-CM

## 2022-08-18 DIAGNOSIS — F41.0 PANIC ATTACK: ICD-10-CM

## 2022-08-18 DIAGNOSIS — F40.00 AGORAPHOBIA: ICD-10-CM

## 2022-08-18 RX ORDER — ALPRAZOLAM 0.25 MG/1
0.25 TABLET ORAL DAILY PRN
Qty: 30 TABLET | Refills: 2 | Status: SHIPPED | OUTPATIENT
Start: 2022-08-18 | End: 2022-12-06

## 2022-08-18 RX ORDER — ALPRAZOLAM 0.25 MG/1
0.25 TABLET ORAL DAILY PRN
Qty: 30 TABLET | Refills: 2 | Status: CANCELLED | OUTPATIENT
Start: 2022-08-18

## 2022-08-18 NOTE — TELEPHONE ENCOUNTER
Requesting Alprazolam 0.25mg  LOV: 5/13/22  RTC: 2 months  Last Relevant Labs: 1/28/21  Filled: 7/13/22 #30 with 0 refills    Future Appointments   Date Time Provider Kole Rita   8/31/2022 10:30 AM Zuleima Murguia MD [facility] P [facility] [address]     Rx pended and routed for approval/denial

## 2022-08-23 ENCOUNTER — PATIENT MESSAGE (OUTPATIENT)
Dept: FAMILY MEDICINE CLINIC | Facility: CLINIC | Age: 37
End: 2022-08-23

## 2022-08-23 NOTE — TELEPHONE ENCOUNTER
From: Wily Santiago  To: Luiz Gitelman, MD  Sent: 8/23/2022 8:51 AM CDT  Subject: Medication refill     Hello,    I requested a medication refill for the Alprazolam medication a few days ago. I was wondering if you get a chance if I can get a refill. Thank you.

## 2022-09-12 DIAGNOSIS — F40.00 AGORAPHOBIA: ICD-10-CM

## 2022-09-12 DIAGNOSIS — F33.1 MODERATE EPISODE OF RECURRENT MAJOR DEPRESSIVE DISORDER (HCC): ICD-10-CM

## 2022-09-12 DIAGNOSIS — F41.0 PANIC ATTACK: ICD-10-CM

## 2022-09-15 RX ORDER — FLUOXETINE 10 MG/1
10 TABLET, FILM COATED ORAL DAILY
Qty: 30 TABLET | Refills: 0 | Status: SHIPPED | OUTPATIENT
Start: 2022-09-15

## 2022-09-15 NOTE — TELEPHONE ENCOUNTER
Sent [facility] -pt has different PCP listed. Asked pt to let us know if she has changed providers, and if so, to have med requests sent to that office. If not, to please call the office to schedule an appt.

## 2022-09-27 DIAGNOSIS — F40.00 AGORAPHOBIA: ICD-10-CM

## 2022-09-27 DIAGNOSIS — R11.0 NAUSEA: ICD-10-CM

## 2022-09-27 DIAGNOSIS — F41.0 PANIC ATTACK: ICD-10-CM

## 2022-09-27 RX ORDER — ALPRAZOLAM 0.25 MG/1
0.25 TABLET ORAL DAILY PRN
Qty: 30 TABLET | Refills: 2 | OUTPATIENT
Start: 2022-09-27

## 2022-09-27 NOTE — TELEPHONE ENCOUNTER
Requesting Alprazolam 0.25mg  LOV: 5/13/22 VV  RTC: 2 months  Last Relevant Labs: 1/28/21  Filled: 8/18/22 #30 with 2 refills    Future Appointments   Date Time Provider Kole Sal[place]   10/27/2022 12:00 PM Gail Rojas MD [facility] OB[facility]N O [facility] [place]     Request denied - 2 refills on file at the pharmacy

## 2022-09-27 NOTE — TELEPHONE ENCOUNTER
Requesting Ondanestron 4mg  LOV: 5/13/22 VV  RTC: 2 months  Last Relevant Labs: 1/28/21  Filled: 3/8/22 #30 with 0 refills    Future Appointments   Date Time Provider Kole [place]   10/27/2022 12:00 PM Jase Greer MD [facility] OB[facility]N O [facility] [place]     Non-protocol med:  Rx pended and routed for approval/denial

## 2022-09-28 RX ORDER — ONDANSETRON 4 MG/1
4 TABLET, ORALLY DISINTEGRATING ORAL EVERY 8 HOURS PRN
Qty: 30 TABLET | Refills: 0 | Status: SHIPPED | OUTPATIENT
Start: 2022-09-28

## 2022-10-03 NOTE — TELEPHONE ENCOUNTER
This is the third attempt to reach this pt in regards to scheduling with NO success scheduling procedure. Letter sent via mail and [employer]. TE closed.

## 2022-10-27 ENCOUNTER — OFFICE VISIT (OUTPATIENT)
Dept: OBGYN CLINIC | Facility: CLINIC | Age: 37
End: 2022-10-27
Payer: MEDICAID

## 2022-10-27 VITALS
WEIGHT: 150.81 LBS | BODY MASS INDEX: 26.39 KG/M2 | HEIGHT: 63.5 IN | RESPIRATION RATE: 16 BRPM | DIASTOLIC BLOOD PRESSURE: 74 MMHG | SYSTOLIC BLOOD PRESSURE: 118 MMHG | HEART RATE: 65 BPM

## 2022-10-27 DIAGNOSIS — Z01.419 ENCOUNTER FOR WELL WOMAN EXAM WITH ROUTINE GYNECOLOGICAL EXAM: Primary | ICD-10-CM

## 2022-10-27 DIAGNOSIS — Z31.89 ENCOUNTER FOR FERTILITY PLANNING: ICD-10-CM

## 2022-10-27 DIAGNOSIS — N92.6 IRREGULAR MENSTRUAL CYCLE: ICD-10-CM

## 2022-10-27 DIAGNOSIS — Z87.42 HISTORY OF PCOS: ICD-10-CM

## 2022-10-27 DIAGNOSIS — Z12.4 SCREENING FOR CERVICAL CANCER: ICD-10-CM

## 2022-10-27 DIAGNOSIS — Z11.3 ROUTINE SCREENING FOR STI (SEXUALLY TRANSMITTED INFECTION): ICD-10-CM

## 2022-10-27 PROCEDURE — 87624 HPV HI-RISK TYP POOLED RSLT: CPT | Performed by: OBSTETRICS & GYNECOLOGY

## 2022-10-27 PROCEDURE — 3078F DIAST BP <80 MM HG: CPT | Performed by: OBSTETRICS & GYNECOLOGY

## 2022-10-27 PROCEDURE — 3008F BODY MASS INDEX DOCD: CPT | Performed by: OBSTETRICS & GYNECOLOGY

## 2022-10-27 PROCEDURE — 87591 N.GONORRHOEAE DNA AMP PROB: CPT | Performed by: OBSTETRICS & GYNECOLOGY

## 2022-10-27 PROCEDURE — 99385 PREV VISIT NEW AGE 18-39: CPT | Performed by: OBSTETRICS & GYNECOLOGY

## 2022-10-27 PROCEDURE — 99202 OFFICE O/P NEW SF 15 MIN: CPT | Performed by: OBSTETRICS & GYNECOLOGY

## 2022-10-27 PROCEDURE — 87491 CHLMYD TRACH DNA AMP PROBE: CPT | Performed by: OBSTETRICS & GYNECOLOGY

## 2022-10-27 PROCEDURE — 3074F SYST BP LT 130 MM HG: CPT | Performed by: OBSTETRICS & GYNECOLOGY

## 2022-10-27 RX ORDER — LEVONORGESTREL AND ETHINYL ESTRADIOL 0.1-0.02MG
1 KIT ORAL DAILY
Qty: 28 TABLET | Refills: 3 | Status: SHIPPED | OUTPATIENT
Start: 2022-10-27 | End: 2023-02-24

## 2022-10-31 LAB
C TRACH DNA SPEC QL NAA+PROBE: NEGATIVE
HPV I/H RISK 1 DNA SPEC QL NAA+PROBE: NEGATIVE
N GONORRHOEA DNA SPEC QL NAA+PROBE: NEGATIVE

## 2022-11-01 ENCOUNTER — TELEPHONE (OUTPATIENT)
Dept: OBGYN CLINIC | Facility: CLINIC | Age: 37
End: 2022-11-01

## 2022-11-01 NOTE — TELEPHONE ENCOUNTER
CPT Code: 30062 Description: Us exam, pelvic, complete  Authorization Number: [id number]  Review Date: 11/1/2022 9:27:59 AM  Expiration Date: 4/30/2023  Status: Your request has been approved for the following procedures: [address], [id number]

## 2022-12-05 ENCOUNTER — OFFICE VISIT (OUTPATIENT)
Dept: FAMILY MEDICINE CLINIC | Facility: CLINIC | Age: 37
End: 2022-12-05
Payer: MEDICAID

## 2022-12-05 VITALS
HEART RATE: 68 BPM | DIASTOLIC BLOOD PRESSURE: 70 MMHG | WEIGHT: 148 LBS | SYSTOLIC BLOOD PRESSURE: 110 MMHG | OXYGEN SATURATION: 99 % | RESPIRATION RATE: 16 BRPM | BODY MASS INDEX: 25.9 KG/M2 | TEMPERATURE: 98 F | HEIGHT: 63.5 IN

## 2022-12-05 DIAGNOSIS — R05.1 ACUTE COUGH: Primary | ICD-10-CM

## 2022-12-05 DIAGNOSIS — Z20.828 EXPOSURE TO INFLUENZA: ICD-10-CM

## 2022-12-05 PROCEDURE — 3078F DIAST BP <80 MM HG: CPT | Performed by: FAMILY MEDICINE

## 2022-12-05 PROCEDURE — 99213 OFFICE O/P EST LOW 20 MIN: CPT | Performed by: FAMILY MEDICINE

## 2022-12-05 PROCEDURE — 87637 SARSCOV2&INF A&B&RSV AMP PRB: CPT | Performed by: FAMILY MEDICINE

## 2022-12-05 PROCEDURE — 3074F SYST BP LT 130 MM HG: CPT | Performed by: FAMILY MEDICINE

## 2022-12-05 PROCEDURE — 3008F BODY MASS INDEX DOCD: CPT | Performed by: FAMILY MEDICINE

## 2022-12-05 RX ORDER — DEXTROMETHORPHAN HYDROBROMIDE AND PROMETHAZINE HYDROCHLORIDE 15; 6.25 MG/5ML; MG/5ML
5 SOLUTION ORAL EVERY 4 HOURS PRN
Qty: 180 ML | Refills: 0 | Status: SHIPPED | OUTPATIENT
Start: 2022-12-05 | End: 2022-12-19

## 2022-12-06 DIAGNOSIS — F40.00 AGORAPHOBIA: ICD-10-CM

## 2022-12-06 DIAGNOSIS — F41.0 PANIC ATTACK: ICD-10-CM

## 2022-12-06 LAB
FLUAV + FLUBV RNA SPEC NAA+PROBE: DETECTED
FLUAV + FLUBV RNA SPEC NAA+PROBE: NOT DETECTED
RSV RNA SPEC NAA+PROBE: NOT DETECTED
SARS-COV-2 RNA RESP QL NAA+PROBE: NOT DETECTED

## 2022-12-06 RX ORDER — ALPRAZOLAM 0.25 MG/1
0.25 TABLET ORAL DAILY PRN
Qty: 30 TABLET | Refills: 2 | Status: SHIPPED | OUTPATIENT
Start: 2022-12-06

## 2022-12-26 ENCOUNTER — TELEPHONE (OUTPATIENT)
Dept: FAMILY MEDICINE CLINIC | Facility: CLINIC | Age: 37
End: 2022-12-26

## 2022-12-26 DIAGNOSIS — F41.0 PANIC ATTACK: ICD-10-CM

## 2022-12-26 DIAGNOSIS — F40.00 AGORAPHOBIA: ICD-10-CM

## 2022-12-26 DIAGNOSIS — F33.1 MODERATE EPISODE OF RECURRENT MAJOR DEPRESSIVE DISORDER (HCC): ICD-10-CM

## 2022-12-27 RX ORDER — ALPRAZOLAM 0.25 MG/1
0.25 TABLET ORAL DAILY PRN
Qty: 30 TABLET | Refills: 2 | OUTPATIENT
Start: 2022-12-27

## 2022-12-27 NOTE — TELEPHONE ENCOUNTER
Requesting Alprazolam 0.25mg  LOV: 12/5/22  RTC: prn  Last Relevant Labs: 1/28/21  Filled: 12/6/22 #30 with 2 refills    No future appointments.     Request denied as duplicate

## 2022-12-28 RX ORDER — FLUOXETINE 10 MG/1
10 TABLET, FILM COATED ORAL DAILY
Qty: 30 TABLET | Refills: 0 | Status: SHIPPED | OUTPATIENT
Start: 2022-12-28

## 2022-12-29 NOTE — TELEPHONE ENCOUNTER
Future Appointments   Date Time Provider Kole [place]   1/27/2023  1:40 PM Dion Vasquez MD [facility] [address] [facility]

## 2023-01-12 DIAGNOSIS — F41.0 PANIC ATTACK: ICD-10-CM

## 2023-01-12 DIAGNOSIS — F40.00 AGORAPHOBIA: ICD-10-CM

## 2023-01-12 DIAGNOSIS — R11.0 NAUSEA: ICD-10-CM

## 2023-01-13 RX ORDER — ALPRAZOLAM 0.25 MG/1
0.25 TABLET ORAL DAILY PRN
Qty: 30 TABLET | Refills: 1 | Status: SHIPPED | OUTPATIENT
Start: 2023-01-13

## 2023-01-13 RX ORDER — ONDANSETRON 4 MG/1
4 TABLET, ORALLY DISINTEGRATING ORAL EVERY 8 HOURS PRN
Qty: 30 TABLET | Refills: 0 | Status: SHIPPED | OUTPATIENT
Start: 2023-01-13

## 2023-01-13 NOTE — TELEPHONE ENCOUNTER
Patient is requesting a refill on:  Alprazolam 0.25mg # 30  Ondansetron 4mg # 30  Last LOV: 12/5/22 Acute Visit  Last Refill: 11/3/21  Last relevant labs: N/A      Non- protocol Medication  RX pended and routed to provider for approval

## 2023-02-13 ENCOUNTER — TELEMEDICINE (OUTPATIENT)
Dept: FAMILY MEDICINE CLINIC | Facility: CLINIC | Age: 38
End: 2023-02-13

## 2023-02-13 DIAGNOSIS — F40.00 AGORAPHOBIA: ICD-10-CM

## 2023-02-13 DIAGNOSIS — F31.81 BIPOLAR 2 DISORDER (HCC): ICD-10-CM

## 2023-02-13 DIAGNOSIS — F41.0 PANIC ATTACK: ICD-10-CM

## 2023-02-13 DIAGNOSIS — Z30.09 FAMILY PLANNING ADVICE: ICD-10-CM

## 2023-02-13 DIAGNOSIS — F33.1 MODERATE EPISODE OF RECURRENT MAJOR DEPRESSIVE DISORDER (HCC): ICD-10-CM

## 2023-02-13 DIAGNOSIS — N92.1 MENORRHAGIA WITH IRREGULAR CYCLE: Primary | ICD-10-CM

## 2023-02-13 RX ORDER — TRANEXAMIC ACID 650 MG/1
1300 TABLET ORAL 3 TIMES DAILY PRN
Qty: 30 TABLET | Refills: 4 | Status: SHIPPED | OUTPATIENT
Start: 2023-02-13

## 2023-02-13 RX ORDER — ALPRAZOLAM 0.25 MG/1
0.25 TABLET ORAL DAILY PRN
Qty: 30 TABLET | Refills: 1 | Status: CANCELLED | OUTPATIENT
Start: 2023-02-13

## 2023-02-13 RX ORDER — FLUOXETINE 10 MG/1
10 TABLET, FILM COATED ORAL DAILY
Qty: 90 TABLET | Refills: 1 | Status: SHIPPED | OUTPATIENT
Start: 2023-02-13

## 2023-02-17 ENCOUNTER — HOSPITAL ENCOUNTER (OUTPATIENT)
Age: 38
Discharge: HOME | End: 2023-02-17
Attending: EMERGENCY MEDICINE
Payer: MEDICAID

## 2023-02-17 ENCOUNTER — APPOINTMENT (OUTPATIENT)
Dept: CT IMAGING | Age: 38
End: 2023-02-17
Attending: EMERGENCY MEDICINE
Payer: MEDICAID

## 2023-02-17 VITALS
BODY MASS INDEX: 24.16 KG/M2 | TEMPERATURE: 98 F | HEART RATE: 52 BPM | RESPIRATION RATE: 16 BRPM | WEIGHT: 145 LBS | OXYGEN SATURATION: 100 % | DIASTOLIC BLOOD PRESSURE: 77 MMHG | SYSTOLIC BLOOD PRESSURE: 108 MMHG | HEIGHT: 65 IN

## 2023-02-17 DIAGNOSIS — N83.201 CYST OF RIGHT OVARY: Primary | ICD-10-CM

## 2023-02-17 DIAGNOSIS — D64.9 ANEMIA, UNSPECIFIED TYPE: ICD-10-CM

## 2023-02-17 LAB
#MXD IC: 0.3 X10ˆ3/UL (ref 0.1–1)
ALBUMIN SERPL-MCNC: 3.6 G/DL (ref 3.4–5)
ALP LIVER SERPL-CCNC: 47 U/L
ALT SERPL-CCNC: 35 U/L
AST SERPL-CCNC: 24 U/L (ref 15–37)
B-HCG UR QL: NEGATIVE
BILIRUB DIRECT SERPL-MCNC: <0.1 MG/DL (ref 0–0.2)
BILIRUB SERPL-MCNC: 0.3 MG/DL (ref 0.1–2)
BUN BLD-MCNC: 9 MG/DL (ref 7–18)
CHLORIDE BLD-SCNC: 105 MMOL/L (ref 98–112)
CO2 BLD-SCNC: 24 MMOL/L (ref 21–32)
CREAT BLD-MCNC: 0.7 MG/DL
GFR SERPLBLD BASED ON 1.73 SQ M-ARVRAT: 113 ML/MIN/1.73M2 (ref 60–?)
GLUCOSE BLD-MCNC: 78 MG/DL (ref 70–99)
HCT VFR BLD AUTO: 29.9 %
HCT VFR BLD CALC: 29 %
HGB BLD-MCNC: 8.1 G/DL
ISTAT IONIZED CALCIUM FOR CHEM 8: 1.25 MMOL/L (ref 1.12–1.32)
LIPASE SERPL-CCNC: 220 U/L (ref 73–393)
LIPASE SERPL-CCNC: 59 U/L (ref 13–75)
LYMPHOCYTES # BLD AUTO: 1.2 X10ˆ3/UL (ref 1–4)
LYMPHOCYTES NFR BLD AUTO: 28.5 %
MCH RBC QN AUTO: 21.3 PG (ref 26–34)
MCHC RBC AUTO-ENTMCNC: 27.1 G/DL (ref 31–37)
MCV RBC AUTO: 78.5 FL (ref 80–100)
MIXED CELL %: 7.6 %
NEUTROPHILS # BLD AUTO: 2.7 X10ˆ3/UL (ref 1.5–7.7)
NEUTROPHILS NFR BLD AUTO: 63.9 %
PLATELET # BLD AUTO: 296 X10ˆ3/UL (ref 150–450)
POCT BILIRUBIN URINE: NEGATIVE
POCT GLUCOSE URINE: NEGATIVE MG/DL
POCT KETONE URINE: NEGATIVE MG/DL
POCT LEUKOCYTE ESTERASE URINE: NEGATIVE
POCT NITRITE URINE: NEGATIVE
POCT PH URINE: 7 (ref 5–8)
POCT PROTEIN URINE: NEGATIVE MG/DL
POCT SPECIFIC GRAVITY URINE: 1.01
POCT URINE CLARITY: CLEAR
POCT URINE COLOR: YELLOW
POCT UROBILINOGEN URINE: 0.2 MG/DL
POTASSIUM BLD-SCNC: 3.8 MMOL/L (ref 3.6–5.1)
PROT SERPL-MCNC: 7.3 G/DL (ref 6.4–8.2)
RBC # BLD AUTO: 3.81 X10ˆ6/UL
SODIUM BLD-SCNC: 139 MMOL/L (ref 136–145)
WBC # BLD AUTO: 4.2 X10ˆ3/UL (ref 4–11)

## 2023-02-17 PROCEDURE — 99214 OFFICE O/P EST MOD 30 MIN: CPT

## 2023-02-17 PROCEDURE — 74177 CT ABD & PELVIS W/CONTRAST: CPT | Performed by: EMERGENCY MEDICINE

## 2023-02-17 PROCEDURE — 80076 HEPATIC FUNCTION PANEL: CPT | Performed by: EMERGENCY MEDICINE

## 2023-02-17 PROCEDURE — 80047 BASIC METABLC PNL IONIZED CA: CPT

## 2023-02-17 PROCEDURE — 83690 ASSAY OF LIPASE: CPT | Performed by: EMERGENCY MEDICINE

## 2023-02-17 PROCEDURE — 81002 URINALYSIS NONAUTO W/O SCOPE: CPT | Performed by: EMERGENCY MEDICINE

## 2023-02-17 PROCEDURE — 96375 TX/PRO/DX INJ NEW DRUG ADDON: CPT

## 2023-02-17 PROCEDURE — 85025 COMPLETE CBC W/AUTO DIFF WBC: CPT | Performed by: EMERGENCY MEDICINE

## 2023-02-17 PROCEDURE — 81025 URINE PREGNANCY TEST: CPT

## 2023-02-17 PROCEDURE — 96374 THER/PROPH/DIAG INJ IV PUSH: CPT

## 2023-02-17 RX ORDER — ONDANSETRON 2 MG/ML
4 INJECTION INTRAMUSCULAR; INTRAVENOUS ONCE
Status: COMPLETED | OUTPATIENT
Start: 2023-02-17 | End: 2023-02-17

## 2023-02-17 RX ORDER — KETOROLAC TROMETHAMINE 30 MG/ML
15 INJECTION, SOLUTION INTRAMUSCULAR; INTRAVENOUS ONCE
Status: COMPLETED | OUTPATIENT
Start: 2023-02-17 | End: 2023-02-17

## 2023-02-17 RX ORDER — FERROUS SULFATE 325(65) MG
325 TABLET ORAL
Qty: 90 TABLET | Refills: 0 | Status: SHIPPED | OUTPATIENT
Start: 2023-02-17 | End: 2023-03-19

## 2023-02-17 RX ORDER — HYDROCODONE BITARTRATE AND ACETAMINOPHEN 5; 325 MG/1; MG/1
1-2 TABLET ORAL EVERY 6 HOURS PRN
Qty: 20 TABLET | Refills: 0 | Status: SHIPPED | OUTPATIENT
Start: 2023-02-17 | End: 2023-02-22

## 2023-02-20 ENCOUNTER — TELEPHONE (OUTPATIENT)
Dept: OBGYN CLINIC | Facility: CLINIC | Age: 38
End: 2023-02-20

## 2023-02-20 NOTE — TELEPHONE ENCOUNTER
Pt calling and was in ER Friday    They found a Cyst and Fibroids    Pt would like to get in    Please call PT

## 2023-03-22 DIAGNOSIS — F40.00 AGORAPHOBIA: ICD-10-CM

## 2023-03-22 DIAGNOSIS — F41.0 PANIC ATTACK: ICD-10-CM

## 2023-03-22 DIAGNOSIS — R11.0 NAUSEA: ICD-10-CM

## 2023-03-22 RX ORDER — ONDANSETRON 4 MG/1
4 TABLET, ORALLY DISINTEGRATING ORAL EVERY 8 HOURS PRN
Qty: 30 TABLET | Refills: 0 | Status: SHIPPED | OUTPATIENT
Start: 2023-03-22

## 2023-03-22 RX ORDER — ALPRAZOLAM 0.25 MG/1
0.25 TABLET ORAL DAILY PRN
Qty: 30 TABLET | Refills: 2 | Status: SHIPPED | OUTPATIENT
Start: 2023-03-22

## 2023-03-28 DIAGNOSIS — F41.0 PANIC ATTACK: ICD-10-CM

## 2023-03-28 DIAGNOSIS — F40.00 AGORAPHOBIA: ICD-10-CM

## 2023-03-28 RX ORDER — ALPRAZOLAM 0.25 MG/1
0.25 TABLET ORAL DAILY PRN
Qty: 30 TABLET | Refills: 2 | Status: SHIPPED | OUTPATIENT
Start: 2023-03-28

## 2023-03-30 ENCOUNTER — TELEPHONE (OUTPATIENT)
Dept: FAMILY MEDICINE CLINIC | Facility: CLINIC | Age: 38
End: 2023-03-30

## 2023-03-30 NOTE — TELEPHONE ENCOUNTER
Per [facility] pharmacy, Alprazolam is on backorder and need a substitute.    -please advise.

## 2023-03-31 NOTE — TELEPHONE ENCOUNTER
Are they out of the 0.25 or all alprazolam?   Let me know, I can send 0.5's and have then cut in half if they have those. Or let patient know she can go to a different pharmacy. Let me know.      Liliana Yanes

## 2023-03-31 NOTE — TELEPHONE ENCOUNTER
Rx was sent to [facility] on 3/28/23 due to [facility] being on backorder. See Refill Encounter 3/28/23.

## 2023-04-07 ENCOUNTER — OFFICE VISIT (OUTPATIENT)
Dept: FAMILY MEDICINE CLINIC | Facility: CLINIC | Age: 38
End: 2023-04-07
Payer: MEDICAID

## 2023-04-07 VITALS
RESPIRATION RATE: 14 BRPM | OXYGEN SATURATION: 99 % | SYSTOLIC BLOOD PRESSURE: 116 MMHG | DIASTOLIC BLOOD PRESSURE: 71 MMHG | TEMPERATURE: 98 F | HEART RATE: 86 BPM

## 2023-04-07 DIAGNOSIS — N30.00 ACUTE CYSTITIS WITHOUT HEMATURIA: Primary | ICD-10-CM

## 2023-04-07 LAB
APPEARANCE: CLEAR
BILIRUBIN: NEGATIVE
GLUCOSE (URINE DIPSTICK): NEGATIVE MG/DL
KETONES (URINE DIPSTICK): NEGATIVE MG/DL
MULTISTIX LOT#: ABNORMAL NUMERIC
NITRITE, URINE: POSITIVE
PH, URINE: 7 (ref 4.5–8)
PROTEIN (URINE DIPSTICK): NEGATIVE MG/DL
SPECIFIC GRAVITY: 1.01 (ref 1–1.03)
URINE-COLOR: YELLOW
UROBILINOGEN,SEMI-QN: 0.2 MG/DL (ref 0–1.9)

## 2023-04-07 PROCEDURE — 87088 URINE BACTERIA CULTURE: CPT | Performed by: PHYSICIAN ASSISTANT

## 2023-04-07 PROCEDURE — 99213 OFFICE O/P EST LOW 20 MIN: CPT | Performed by: PHYSICIAN ASSISTANT

## 2023-04-07 PROCEDURE — 81003 URINALYSIS AUTO W/O SCOPE: CPT | Performed by: PHYSICIAN ASSISTANT

## 2023-04-07 PROCEDURE — 87186 SC STD MICRODIL/AGAR DIL: CPT | Performed by: PHYSICIAN ASSISTANT

## 2023-04-07 PROCEDURE — 3078F DIAST BP <80 MM HG: CPT | Performed by: PHYSICIAN ASSISTANT

## 2023-04-07 PROCEDURE — 87086 URINE CULTURE/COLONY COUNT: CPT | Performed by: PHYSICIAN ASSISTANT

## 2023-04-07 PROCEDURE — 3074F SYST BP LT 130 MM HG: CPT | Performed by: PHYSICIAN ASSISTANT

## 2023-04-07 RX ORDER — NITROFURANTOIN 25; 75 MG/1; MG/1
100 CAPSULE ORAL 2 TIMES DAILY
Qty: 14 CAPSULE | Refills: 0 | Status: SHIPPED | OUTPATIENT
Start: 2023-04-07 | End: 2023-04-14

## 2023-04-07 NOTE — PATIENT INSTRUCTIONS
Push fluids   Will call or mychart with urine culture results   Please follow up with PCP if no improvement or if symptoms worsen

## 2023-04-17 ENCOUNTER — OFFICE VISIT (OUTPATIENT)
Dept: FAMILY MEDICINE CLINIC | Facility: CLINIC | Age: 38
End: 2023-04-17
Payer: MEDICAID

## 2023-04-17 VITALS
SYSTOLIC BLOOD PRESSURE: 100 MMHG | BODY MASS INDEX: 26.66 KG/M2 | RESPIRATION RATE: 16 BRPM | WEIGHT: 160 LBS | TEMPERATURE: 98 F | HEIGHT: 65 IN | OXYGEN SATURATION: 100 % | HEART RATE: 88 BPM | DIASTOLIC BLOOD PRESSURE: 70 MMHG

## 2023-04-17 DIAGNOSIS — N30.00 ACUTE CYSTITIS WITHOUT HEMATURIA: Primary | ICD-10-CM

## 2023-04-17 LAB
APPEARANCE: CLEAR
BILIRUBIN: NEGATIVE
GLUCOSE (URINE DIPSTICK): NEGATIVE MG/DL
KETONES (URINE DIPSTICK): NEGATIVE MG/DL
LEUKOCYTES: NEGATIVE
NITRITE, URINE: NEGATIVE
PH, URINE: 7 (ref 4.5–8)
PROTEIN (URINE DIPSTICK): NEGATIVE MG/DL
SPECIFIC GRAVITY: 1.01 (ref 1–1.03)
URINE-COLOR: YELLOW
UROBILINOGEN,SEMI-QN: 0.2 MG/DL (ref 0–1.9)

## 2023-04-17 PROCEDURE — 3074F SYST BP LT 130 MM HG: CPT | Performed by: FAMILY MEDICINE

## 2023-04-17 PROCEDURE — 99213 OFFICE O/P EST LOW 20 MIN: CPT | Performed by: FAMILY MEDICINE

## 2023-04-17 PROCEDURE — 87086 URINE CULTURE/COLONY COUNT: CPT | Performed by: FAMILY MEDICINE

## 2023-04-17 PROCEDURE — 81003 URINALYSIS AUTO W/O SCOPE: CPT | Performed by: FAMILY MEDICINE

## 2023-04-17 PROCEDURE — 3008F BODY MASS INDEX DOCD: CPT | Performed by: FAMILY MEDICINE

## 2023-04-17 PROCEDURE — 3078F DIAST BP <80 MM HG: CPT | Performed by: FAMILY MEDICINE

## 2023-04-17 RX ORDER — CIPROFLOXACIN 750 MG/1
750 TABLET, FILM COATED ORAL 2 TIMES DAILY
Qty: 20 TABLET | Refills: 0 | Status: SHIPPED | OUTPATIENT
Start: 2023-04-17 | End: 2023-04-27

## 2023-04-17 RX ORDER — PHENAZOPYRIDINE HYDROCHLORIDE 200 MG/1
200 TABLET, FILM COATED ORAL 3 TIMES DAILY PRN
Qty: 15 TABLET | Refills: 0 | Status: SHIPPED | OUTPATIENT
Start: 2023-04-17 | End: 2023-04-22

## 2023-05-03 ENCOUNTER — OFFICE VISIT (OUTPATIENT)
Dept: FAMILY MEDICINE CLINIC | Facility: CLINIC | Age: 38
End: 2023-05-03
Payer: MEDICAID

## 2023-05-03 VITALS
OXYGEN SATURATION: 97 % | HEIGHT: 65 IN | HEART RATE: 64 BPM | RESPIRATION RATE: 16 BRPM | BODY MASS INDEX: 26.99 KG/M2 | TEMPERATURE: 98 F | WEIGHT: 162 LBS | DIASTOLIC BLOOD PRESSURE: 70 MMHG | SYSTOLIC BLOOD PRESSURE: 106 MMHG

## 2023-05-03 DIAGNOSIS — R31.9 HEMATURIA, UNSPECIFIED TYPE: Primary | ICD-10-CM

## 2023-05-03 DIAGNOSIS — R30.0 DYSURIA: ICD-10-CM

## 2023-05-03 LAB
APPEARANCE: CLEAR
BILIRUBIN: NEGATIVE
GLUCOSE (URINE DIPSTICK): NEGATIVE MG/DL
KETONES (URINE DIPSTICK): NEGATIVE MG/DL
LEUKOCYTES: NEGATIVE
NITRITE, URINE: NEGATIVE
PH, URINE: 5.5 (ref 4.5–8)
PROTEIN (URINE DIPSTICK): NEGATIVE MG/DL
SPECIFIC GRAVITY: 1.01 (ref 1–1.03)
URINE-COLOR: YELLOW
UROBILINOGEN,SEMI-QN: 0.2 MG/DL (ref 0–1.9)

## 2023-05-03 PROCEDURE — 87086 URINE CULTURE/COLONY COUNT: CPT | Performed by: FAMILY MEDICINE

## 2023-05-03 PROCEDURE — 3078F DIAST BP <80 MM HG: CPT | Performed by: FAMILY MEDICINE

## 2023-05-03 PROCEDURE — 81003 URINALYSIS AUTO W/O SCOPE: CPT | Performed by: FAMILY MEDICINE

## 2023-05-03 PROCEDURE — 99213 OFFICE O/P EST LOW 20 MIN: CPT | Performed by: FAMILY MEDICINE

## 2023-05-03 PROCEDURE — 3008F BODY MASS INDEX DOCD: CPT | Performed by: FAMILY MEDICINE

## 2023-05-03 PROCEDURE — 3074F SYST BP LT 130 MM HG: CPT | Performed by: FAMILY MEDICINE

## 2023-05-03 RX ORDER — CEPHALEXIN 500 MG/1
500 CAPSULE ORAL 2 TIMES DAILY
Qty: 14 CAPSULE | Refills: 0 | Status: SHIPPED | OUTPATIENT
Start: 2023-05-03 | End: 2023-05-10

## 2023-05-11 ENCOUNTER — PATIENT MESSAGE (OUTPATIENT)
Dept: FAMILY MEDICINE CLINIC | Facility: CLINIC | Age: 38
End: 2023-05-11

## 2023-05-12 ENCOUNTER — OFFICE VISIT (OUTPATIENT)
Dept: FAMILY MEDICINE CLINIC | Facility: CLINIC | Age: 38
End: 2023-05-12
Payer: MEDICAID

## 2023-05-12 VITALS
DIASTOLIC BLOOD PRESSURE: 84 MMHG | HEART RATE: 102 BPM | RESPIRATION RATE: 16 BRPM | TEMPERATURE: 97 F | HEIGHT: 65 IN | SYSTOLIC BLOOD PRESSURE: 124 MMHG | WEIGHT: 161 LBS | OXYGEN SATURATION: 99 % | BODY MASS INDEX: 26.82 KG/M2

## 2023-05-12 DIAGNOSIS — N39.0 CHRONIC UTI (URINARY TRACT INFECTION): ICD-10-CM

## 2023-05-12 DIAGNOSIS — R10.2 PELVIC PAIN: Primary | ICD-10-CM

## 2023-05-12 LAB
APPEARANCE: CLEAR
BILIRUBIN: NEGATIVE
GLUCOSE (URINE DIPSTICK): NEGATIVE MG/DL
KETONES (URINE DIPSTICK): NEGATIVE MG/DL
NITRITE, URINE: NEGATIVE
OCCULT BLOOD: NEGATIVE
PH, URINE: 7 (ref 4.5–8)
PROTEIN (URINE DIPSTICK): NEGATIVE MG/DL
SPECIFIC GRAVITY: 1.01 (ref 1–1.03)
URINE-COLOR: YELLOW
UROBILINOGEN,SEMI-QN: 0.2 MG/DL (ref 0–1.9)

## 2023-05-12 PROCEDURE — 3079F DIAST BP 80-89 MM HG: CPT | Performed by: FAMILY MEDICINE

## 2023-05-12 PROCEDURE — 87086 URINE CULTURE/COLONY COUNT: CPT | Performed by: FAMILY MEDICINE

## 2023-05-12 PROCEDURE — 3074F SYST BP LT 130 MM HG: CPT | Performed by: FAMILY MEDICINE

## 2023-05-12 PROCEDURE — 81003 URINALYSIS AUTO W/O SCOPE: CPT | Performed by: FAMILY MEDICINE

## 2023-05-12 PROCEDURE — 99214 OFFICE O/P EST MOD 30 MIN: CPT | Performed by: FAMILY MEDICINE

## 2023-05-12 PROCEDURE — 3008F BODY MASS INDEX DOCD: CPT | Performed by: FAMILY MEDICINE

## 2023-05-12 RX ORDER — FLUCONAZOLE 150 MG/1
150 TABLET ORAL ONCE
Qty: 1 TABLET | Refills: 0 | Status: SHIPPED | OUTPATIENT
Start: 2023-05-12 | End: 2023-05-12

## 2023-05-12 RX ORDER — PHENAZOPYRIDINE HYDROCHLORIDE 200 MG/1
200 TABLET, FILM COATED ORAL 3 TIMES DAILY PRN
Qty: 15 TABLET | Refills: 0 | Status: SHIPPED | OUTPATIENT
Start: 2023-05-12 | End: 2023-05-17

## 2023-05-26 DIAGNOSIS — F41.0 PANIC ATTACK: ICD-10-CM

## 2023-05-26 DIAGNOSIS — F31.81 BIPOLAR 2 DISORDER (HCC): ICD-10-CM

## 2023-05-26 DIAGNOSIS — F33.1 MODERATE EPISODE OF RECURRENT MAJOR DEPRESSIVE DISORDER (HCC): ICD-10-CM

## 2023-05-26 DIAGNOSIS — F40.00 AGORAPHOBIA: ICD-10-CM

## 2023-05-26 RX ORDER — FLUOXETINE 10 MG/1
10 TABLET, FILM COATED ORAL DAILY
Qty: 90 TABLET | Refills: 1 | Status: CANCELLED | OUTPATIENT
Start: 2023-05-26

## 2023-06-06 DIAGNOSIS — R11.0 NAUSEA: ICD-10-CM

## 2023-06-06 DIAGNOSIS — F41.0 PANIC ATTACK: ICD-10-CM

## 2023-06-06 DIAGNOSIS — F40.00 AGORAPHOBIA: ICD-10-CM

## 2023-06-07 DIAGNOSIS — R11.0 NAUSEA: ICD-10-CM

## 2023-06-07 DIAGNOSIS — F40.00 AGORAPHOBIA: ICD-10-CM

## 2023-06-07 DIAGNOSIS — F41.0 PANIC ATTACK: ICD-10-CM

## 2023-06-07 NOTE — TELEPHONE ENCOUNTER
ondansetron 4 MG Oral Tablet Dispersible             Sig: Take 1 tablet (4 mg total) by mouth every 8 (eight) hours as needed for Nausea. Disp: 30 tablet    Refills: 0    Start: 6/6/2023    Class: Normal    Non-formulary For: Nausea    Last ordered: 2 months ago by Ana Maria Benton MD        To be filled at: [address] [facility] [address] [facility] [phone], [phone]           LOV: 5/12/23  Filled: 3/22/23 #30 with 0 refills    No future appointments. Non protocol med pended & routed to PCP.

## 2023-06-08 DIAGNOSIS — F40.00 AGORAPHOBIA: ICD-10-CM

## 2023-06-08 DIAGNOSIS — F41.0 PANIC ATTACK: ICD-10-CM

## 2023-06-08 RX ORDER — ONDANSETRON 4 MG/1
4 TABLET, ORALLY DISINTEGRATING ORAL EVERY 8 HOURS PRN
Qty: 30 TABLET | Refills: 0 | Status: SHIPPED | OUTPATIENT
Start: 2023-06-08

## 2023-06-08 RX ORDER — ALPRAZOLAM 0.25 MG/1
0.25 TABLET ORAL DAILY PRN
Qty: 30 TABLET | Refills: 2 | Status: SHIPPED | OUTPATIENT
Start: 2023-06-26

## 2023-06-08 NOTE — TELEPHONE ENCOUNTER
Patient calling, patient requesting prescription for ALPRAZolam 0.25 MG Oral Tab to be sent to [facility] in [place]. Insurance no longer covers [facility] pharmacy, patient needs medication before traveling. Patient has sent several [employer] messages.  Please advise

## 2023-06-09 RX ORDER — ALPRAZOLAM 0.25 MG/1
0.25 TABLET ORAL DAILY PRN
Qty: 30 TABLET | Refills: 2 | OUTPATIENT
Start: 2023-06-09

## 2023-06-09 RX ORDER — ONDANSETRON 4 MG/1
4 TABLET, ORALLY DISINTEGRATING ORAL EVERY 8 HOURS PRN
Qty: 30 TABLET | Refills: 0 | OUTPATIENT
Start: 2023-06-09

## 2023-07-16 ENCOUNTER — HOSPITAL ENCOUNTER (OUTPATIENT)
Age: 38
Discharge: HOME | End: 2023-07-16
Payer: MEDICAID

## 2023-07-16 VITALS
WEIGHT: 140 LBS | RESPIRATION RATE: 20 BRPM | TEMPERATURE: 98 F | SYSTOLIC BLOOD PRESSURE: 116 MMHG | DIASTOLIC BLOOD PRESSURE: 86 MMHG | HEIGHT: 63 IN | BODY MASS INDEX: 24.8 KG/M2 | OXYGEN SATURATION: 99 % | HEART RATE: 86 BPM

## 2023-07-16 DIAGNOSIS — N94.89 LABIAL PAIN: ICD-10-CM

## 2023-07-16 DIAGNOSIS — N75.0 BARTHOLIN CYST: Primary | ICD-10-CM

## 2023-07-16 PROCEDURE — 99214 OFFICE O/P EST MOD 30 MIN: CPT

## 2023-07-16 PROCEDURE — 99213 OFFICE O/P EST LOW 20 MIN: CPT

## 2023-07-16 RX ORDER — LIDOCAINE HYDROCHLORIDE 20 MG/ML
1 JELLY TOPICAL 3 TIMES DAILY
Qty: 60 ML | Refills: 0 | Status: SHIPPED | OUTPATIENT
Start: 2023-07-16 | End: 2023-07-16

## 2023-07-16 RX ORDER — FLUCONAZOLE 150 MG/1
150 TABLET ORAL ONCE
Qty: 2 TABLET | Refills: 0 | Status: SHIPPED | OUTPATIENT
Start: 2023-07-16 | End: 2023-07-16

## 2023-07-16 RX ORDER — DOXYCYCLINE HYCLATE 100 MG/1
100 CAPSULE ORAL 2 TIMES DAILY
Qty: 14 CAPSULE | Refills: 0 | Status: SHIPPED | OUTPATIENT
Start: 2023-07-16 | End: 2023-07-23

## 2023-07-16 RX ORDER — HYDROCODONE BITARTRATE AND ACETAMINOPHEN 5; 325 MG/1; MG/1
1-2 TABLET ORAL EVERY 6 HOURS PRN
Qty: 10 TABLET | Refills: 0 | Status: SHIPPED | OUTPATIENT
Start: 2023-07-16 | End: 2023-07-21

## 2023-07-16 RX ORDER — ACETAMINOPHEN AND CODEINE PHOSPHATE 300; 30 MG/1; MG/1
1-2 TABLET ORAL EVERY 6 HOURS PRN
Qty: 20 TABLET | Refills: 0 | Status: SHIPPED | OUTPATIENT
Start: 2023-07-16 | End: 2023-07-16

## 2023-07-16 NOTE — DISCHARGE INSTRUCTIONS
Apply a warm compress to your abscess. This will help it open and drain. Wet a washcloth in warm, but not hot, water, or an electric heating pad. Apply the compress for 20 minutes. Repeat this 6-8 times each day. Do not press on an abscess or try to open it with a needle. You may push the bacteria deeper or into your blood. Sitz baths  Do not share your clothes, towels, or sheets with anyone. This can spread the infection to others. Wash your hands often. This can help prevent the spread of germs. Use soap and water or an alcohol-based hand rub.

## 2023-07-16 NOTE — ED INITIAL ASSESSMENT (HPI)
C/o vaginal abscess for 4 days. Pt reports abscess to left side of labia. Pt reports small abscess that got bigger and painful over time. Pt reports abscess bigger after shaving. Pt reports this has never happened after shaving. Pt reports use of warm compress. Pt reports having fever.

## 2023-09-07 ENCOUNTER — OFFICE VISIT (OUTPATIENT)
Dept: FAMILY MEDICINE CLINIC | Facility: CLINIC | Age: 38
End: 2023-09-07
Payer: MEDICAID

## 2023-09-07 ENCOUNTER — HOSPITAL ENCOUNTER (EMERGENCY)
Age: 38
Discharge: STILL ADMITTED OR ADMINISTRATIVE | End: 2023-09-07
Payer: MEDICAID

## 2023-09-07 VITALS
SYSTOLIC BLOOD PRESSURE: 112 MMHG | BODY MASS INDEX: 29.95 KG/M2 | OXYGEN SATURATION: 98 % | WEIGHT: 169 LBS | RESPIRATION RATE: 16 BRPM | TEMPERATURE: 99 F | HEART RATE: 83 BPM | HEIGHT: 63 IN | DIASTOLIC BLOOD PRESSURE: 82 MMHG

## 2023-09-07 DIAGNOSIS — R10.2 SUPRAPUBIC PAIN, ACUTE: ICD-10-CM

## 2023-09-07 DIAGNOSIS — N92.6 IRREGULAR PERIODS/MENSTRUAL CYCLES: Primary | ICD-10-CM

## 2023-09-07 DIAGNOSIS — M54.50 ACUTE LOW BACK PAIN, UNSPECIFIED BACK PAIN LATERALITY, UNSPECIFIED WHETHER SCIATICA PRESENT: ICD-10-CM

## 2023-09-07 DIAGNOSIS — R10.12 LUQ ABDOMINAL PAIN: ICD-10-CM

## 2023-09-07 LAB
APPEARANCE: CLEAR
BILIRUBIN: NEGATIVE
CONTROL LINE PRESENT WITH A CLEAR BACKGROUND (YES/NO): YES YES/NO
GLUCOSE (URINE DIPSTICK): NEGATIVE MG/DL
KETONES (URINE DIPSTICK): NEGATIVE MG/DL
KIT LOT #: NORMAL NUMERIC
LEUKOCYTES: NEGATIVE
MULTISTIX LOT#: NORMAL NUMERIC
NITRITE, URINE: NEGATIVE
OCCULT BLOOD: NEGATIVE
PH, URINE: 7.5 (ref 4.5–8)
PREGNANCY TEST, URINE: NEGATIVE
PROTEIN (URINE DIPSTICK): NEGATIVE MG/DL
SPECIFIC GRAVITY: 1.01 (ref 1–1.03)
URINE-COLOR: YELLOW
UROBILINOGEN,SEMI-QN: 0.2 MG/DL (ref 0–1.9)

## 2023-09-07 PROCEDURE — 99214 OFFICE O/P EST MOD 30 MIN: CPT | Performed by: NURSE PRACTITIONER

## 2023-09-07 PROCEDURE — 81003 URINALYSIS AUTO W/O SCOPE: CPT | Performed by: NURSE PRACTITIONER

## 2023-09-07 PROCEDURE — 3079F DIAST BP 80-89 MM HG: CPT | Performed by: NURSE PRACTITIONER

## 2023-09-07 PROCEDURE — 3008F BODY MASS INDEX DOCD: CPT | Performed by: NURSE PRACTITIONER

## 2023-09-07 PROCEDURE — 81025 URINE PREGNANCY TEST: CPT | Performed by: NURSE PRACTITIONER

## 2023-09-07 PROCEDURE — 3074F SYST BP LT 130 MM HG: CPT | Performed by: NURSE PRACTITIONER

## 2023-09-26 DIAGNOSIS — R11.0 NAUSEA: ICD-10-CM

## 2023-09-26 RX ORDER — ONDANSETRON 4 MG/1
4 TABLET, ORALLY DISINTEGRATING ORAL EVERY 8 HOURS PRN
Qty: 30 TABLET | Refills: 0 | OUTPATIENT
Start: 2023-09-26

## 2023-09-28 ENCOUNTER — OFFICE VISIT (OUTPATIENT)
Dept: GASTROENTEROLOGY | Facility: CLINIC | Age: 38
End: 2023-09-28

## 2023-09-28 ENCOUNTER — TELEPHONE (OUTPATIENT)
Dept: GASTROENTEROLOGY | Facility: CLINIC | Age: 38
End: 2023-09-28

## 2023-09-28 VITALS
HEART RATE: 75 BPM | SYSTOLIC BLOOD PRESSURE: 113 MMHG | DIASTOLIC BLOOD PRESSURE: 80 MMHG | BODY MASS INDEX: 29.95 KG/M2 | WEIGHT: 169 LBS | HEIGHT: 63 IN

## 2023-09-28 DIAGNOSIS — K58.1 IRRITABLE BOWEL SYNDROME WITH CONSTIPATION: ICD-10-CM

## 2023-09-28 DIAGNOSIS — D64.9 ANEMIA, UNSPECIFIED TYPE: ICD-10-CM

## 2023-09-28 DIAGNOSIS — K62.5 RECTAL BLEEDING: ICD-10-CM

## 2023-09-28 DIAGNOSIS — D64.9 ANEMIA, UNSPECIFIED TYPE: Primary | ICD-10-CM

## 2023-09-28 DIAGNOSIS — R10.9 ABDOMINAL PAIN, UNSPECIFIED ABDOMINAL LOCATION: ICD-10-CM

## 2023-09-28 DIAGNOSIS — K59.04 CHRONIC IDIOPATHIC CONSTIPATION: Primary | ICD-10-CM

## 2023-09-28 DIAGNOSIS — K59.04 CHRONIC IDIOPATHIC CONSTIPATION: ICD-10-CM

## 2023-09-28 PROCEDURE — 99214 OFFICE O/P EST MOD 30 MIN: CPT | Performed by: INTERNAL MEDICINE

## 2023-09-28 PROCEDURE — 3079F DIAST BP 80-89 MM HG: CPT | Performed by: INTERNAL MEDICINE

## 2023-09-28 PROCEDURE — 3074F SYST BP LT 130 MM HG: CPT | Performed by: INTERNAL MEDICINE

## 2023-09-28 PROCEDURE — 3008F BODY MASS INDEX DOCD: CPT | Performed by: INTERNAL MEDICINE

## 2023-09-28 RX ORDER — PLECANATIDE 3 MG/1
1 TABLET ORAL DAILY
Qty: 30 TABLET | Refills: 11 | Status: SHIPPED | OUTPATIENT
Start: 2023-09-28 | End: 2023-10-28

## 2023-09-28 NOTE — PATIENT INSTRUCTIONS
1. Schedule colonoscopy and EGD with MAC at the hospital on a Friday    2.  bowel prep from pharmacy (split trilyte or golytely). As we discussed it is important to take the bowel preparation in two parts taking 2L of the liquid the night before the procedure and the second 2L the morning of the procedure starting approximately 6 hours prior to your scheduled procedure time. 3. Continue all medications for procedure    4. Read all bowel prep instructions carefully    5. AVOID seeds, nuts, popcorn, raw fruits and vegetables (cooked is okay) for 2-3 days before procedure    >>>Please note: if you were prescribed a bowel prep and it is too expensive or not covered by insurance, it is okay to substitute Trilyte or Golytely (or any similar generic prep). This can be done by notifying the pharmacy or calling our office.      6. Complete your blood tests

## 2023-09-28 NOTE — TELEPHONE ENCOUNTER
Scheduled for:  Colonoscopy [phone] , [address] [facility] ,Meagan Lai   Provider Name:  Jerrica Aranda  Date:  10/20/23  Location:  [facility]   Sedation:  Mac   Time:  1:30 Pm (pt is aware to arrive at 12:30 Pm)   Prep: Golytely or Trilyte ,Egd  Prep instructions were given to pt in the office, I discuss prep Instructions with patient at the time of the appointment which she verbally understood and given the prep instructions at the time of the appointment  Meds/Allergies Reconciled?:  Physician reviewed     Diagnosis with codes:  Anemia D64.9, Rectal bleeding K62.5 ,chronic constipation K59.04 , IBS K58.1,   Was patient informed to call insurance with codes (Y/N):  Yes, I confirmed Medicaid replacement insurance with the patient. The patient also verbally understands to call her insurance to check for pre-cert, codes were given on prep instructions. Referral sent?:  Referral was sent at the time of electronic surgical scheduling. [facility] or [address] [place] [address] notified?:  I sent an electronic request to Endo Scheduling and received a confirmation today. Medication Orders: This patient verbally confirmed that she is not taking:   Iron, blood thinners, BP meds, and is not diabetic   Not latex allergy, Not PCN allergy and does not have a pacemaker Pt is aware to NOT take iron pills, herbal meds and diet supplements for 7 days before exam. Also to NOT take any form of alcohol, recreational drugs and any forms of ED meds 24 hours before exam.    Misc Orders:  Patient verbally understood & will await a phone call from [facility] to schedule. Further instructions given by staff:   Patient was informed about the new cancellation policy for his/her procedure. Patient was also given a copy of the cancellation policy at the time of the appointment and verbalized understanding.

## 2023-10-03 ENCOUNTER — TELEPHONE (OUTPATIENT)
Facility: CLINIC | Age: 38
End: 2023-10-03

## 2023-10-03 NOTE — TELEPHONE ENCOUNTER
Current Outpatient Medications   Medication Sig Dispense Refill    Plecanatide (TRULANCE) 3 MG Oral Tab Take 1 tablet by mouth daily. 30 tablet 11       [email or web address]    Trx code: K300-P821

## 2023-10-03 NOTE — TELEPHONE ENCOUNTER
PPD,    Please assist with PA for trulance, thank you.     Chronic idiopathic constipation K59.04    Irritable bowel syndrome with constipation K58.1

## 2023-10-04 NOTE — TELEPHONE ENCOUNTER
Medication PA Requested:   Plecanatide (TRULANCE) 3 MG Oral Tab                                                        CoverMyMeds Used: no  Key:  Quantity: 30  Day Supply: 30  Sig: Take 1 tablet by mouth daily.    DX Code: K59.04, K58.1                                  CPT code (if applicable):   Case Number/Pending Ref#:    ePA submitted with LOV 9/28/23  Awaiting determination

## 2023-10-15 DIAGNOSIS — R11.0 NAUSEA: ICD-10-CM

## 2023-10-17 NOTE — TELEPHONE ENCOUNTER
Patient is requesting a refill on: Zofran 4 mg # 30  Last LOV: Acute 5/12/23  Last Refill: 6/9/23    RTC: Next Appt:   10/20/2023 - [facility]54 GI PROCEDURE     Non- protocol Medication  RX pended and routed to provider for approval

## 2023-10-18 RX ORDER — ONDANSETRON 4 MG/1
4 TABLET, ORALLY DISINTEGRATING ORAL EVERY 8 HOURS PRN
Qty: 30 TABLET | Refills: 0 | Status: SHIPPED | OUTPATIENT
Start: 2023-10-18

## 2023-10-19 NOTE — PAT NURSING NOTE
Client aware she needs transportation, client aware she can not take taxi/urber/lyft type of transportation. Client aware and agreeable.

## 2023-10-20 ENCOUNTER — HOSPITAL ENCOUNTER (OUTPATIENT)
Facility: HOSPITAL | Age: 38
Setting detail: HOSPITAL OUTPATIENT SURGERY
Discharge: HOME | End: 2023-10-20
Attending: INTERNAL MEDICINE | Admitting: INTERNAL MEDICINE

## 2023-10-20 ENCOUNTER — ANESTHESIA EVENT (OUTPATIENT)
Dept: ENDOSCOPY | Facility: HOSPITAL | Age: 38
End: 2023-10-20
Payer: MEDICAID

## 2023-10-20 ENCOUNTER — ANESTHESIA (OUTPATIENT)
Dept: ENDOSCOPY | Facility: HOSPITAL | Age: 38
End: 2023-10-20
Payer: MEDICAID

## 2023-10-20 VITALS
SYSTOLIC BLOOD PRESSURE: 104 MMHG | OXYGEN SATURATION: 100 % | WEIGHT: 169 LBS | HEIGHT: 63 IN | HEART RATE: 66 BPM | RESPIRATION RATE: 12 BRPM | BODY MASS INDEX: 29.95 KG/M2 | DIASTOLIC BLOOD PRESSURE: 72 MMHG | TEMPERATURE: 98 F

## 2023-10-20 DIAGNOSIS — K58.1 IRRITABLE BOWEL SYNDROME WITH CONSTIPATION: ICD-10-CM

## 2023-10-20 DIAGNOSIS — K62.5 RECTAL BLEEDING: ICD-10-CM

## 2023-10-20 DIAGNOSIS — D64.9 ANEMIA, UNSPECIFIED TYPE: ICD-10-CM

## 2023-10-20 DIAGNOSIS — K59.04 CHRONIC IDIOPATHIC CONSTIPATION: ICD-10-CM

## 2023-10-20 LAB — B-HCG UR QL: NEGATIVE

## 2023-10-20 PROCEDURE — 43235 EGD DIAGNOSTIC BRUSH WASH: CPT | Performed by: INTERNAL MEDICINE

## 2023-10-20 PROCEDURE — 0DJD8ZZ INSPECTION OF LOWER INTESTINAL TRACT, VIA NATURAL OR ARTIFICIAL OPENING ENDOSCOPIC: ICD-10-PCS | Performed by: INTERNAL MEDICINE

## 2023-10-20 PROCEDURE — 0DB78ZX EXCISION OF STOMACH, PYLORUS, VIA NATURAL OR ARTIFICIAL OPENING ENDOSCOPIC, DIAGNOSTIC: ICD-10-PCS | Performed by: INTERNAL MEDICINE

## 2023-10-20 PROCEDURE — 0DB98ZX EXCISION OF DUODENUM, VIA NATURAL OR ARTIFICIAL OPENING ENDOSCOPIC, DIAGNOSTIC: ICD-10-PCS | Performed by: INTERNAL MEDICINE

## 2023-10-20 PROCEDURE — 45378 DIAGNOSTIC COLONOSCOPY: CPT | Performed by: INTERNAL MEDICINE

## 2023-10-20 RX ORDER — SODIUM CHLORIDE, SODIUM LACTATE, POTASSIUM CHLORIDE, CALCIUM CHLORIDE 600; 310; 30; 20 MG/100ML; MG/100ML; MG/100ML; MG/100ML
INJECTION, SOLUTION INTRAVENOUS CONTINUOUS
Status: DISCONTINUED | OUTPATIENT
Start: 2023-10-20 | End: 2023-10-20

## 2023-10-20 RX ORDER — LIDOCAINE HYDROCHLORIDE 10 MG/ML
INJECTION, SOLUTION EPIDURAL; INFILTRATION; INTRACAUDAL; PERINEURAL AS NEEDED
Status: DISCONTINUED | OUTPATIENT
Start: 2023-10-20 | End: 2023-10-20 | Stop reason: SURG

## 2023-10-20 RX ORDER — GLYCOPYRROLATE 0.2 MG/ML
INJECTION, SOLUTION INTRAMUSCULAR; INTRAVENOUS AS NEEDED
Status: DISCONTINUED | OUTPATIENT
Start: 2023-10-20 | End: 2023-10-20 | Stop reason: SURG

## 2023-10-20 RX ADMIN — GLYCOPYRROLATE 0.2 MG: 0.2 INJECTION, SOLUTION INTRAMUSCULAR; INTRAVENOUS at 13:22:00

## 2023-10-20 RX ADMIN — LIDOCAINE HYDROCHLORIDE 50 MG: 10 INJECTION, SOLUTION EPIDURAL; INFILTRATION; INTRACAUDAL; PERINEURAL at 13:22:00

## 2023-10-20 NOTE — H&P
History & Physical Examination    Patient Name: Linda Salomon  MRN: [id number]  CSN: [id number]  YOB: 1985    Diagnosis: rectal bleeding, anemia, abdominal pain    ondansetron 4 MG Oral Tablet Dispersible, Take 1 tablet (4 mg total) by mouth every 8 (eight) hours as needed for Nausea., Disp: 30 tablet, Rfl: 0  ALPRAZolam 0.25 MG Oral Tab, Take 1 tablet (0.25 mg total) by mouth daily as needed (panic attack). , Disp: 30 tablet, Rfl: 2  FLUoxetine 10 MG Oral Tab, Take 1 tablet (10 mg total) by mouth daily. , Disp: 90 tablet, Rfl: 1  Plecanatide (TRULANCE) 3 MG Oral Tab, Take 1 tablet by mouth daily. , Disp: 30 tablet, Rfl: 11  [] polyethylene glycol, PEG 3350-KCl-NaBcb-NaCl-NaSulf, 236 g Oral Recon Soln, Take 4,000 mL by mouth once for 1 dose., Disp: 4000 mL, Rfl: 0  [] doxycycline 100 MG Oral Cap, Take 1 capsule (100 mg total) by mouth 2 (two) times daily for 7 days. , Disp: 14 capsule, Rfl: 0  [] Lidocaine, Anorectal, 5 % External Gel, Apply 1 Application topically 3 (three) times daily as needed. , Disp: 113 g, Rfl: 0  tranexamic acid 650 MG Oral Tab, Take 2 tablets (1,300 mg total) by mouth 3 (three) times daily as needed. For up to the first 5 days of your period, Disp: 30 tablet, Rfl: 4      lactated ringers infusion, , Intravenous, Continuous        Allergies:   Sulfa Antibiotics       HIVES  Ciprofloxacin           HALLUCINATION    Comment:+ dizzy    Past Medical History:   Diagnosis Date    Anxiety     Chronic constipation     Constipation     COVID-19 virus infection 2021    + test on 21, per pt, no results in system    Depression     IBS (irritable bowel syndrome)     Menorrhagia with irregular cycle 2023    Ovarian cyst     Polycystic ovarian syndrome      Past Surgical History:   Procedure Laterality Date    COLONOSCOPY  2011    D & C      EGD  2011     History reviewed. No pertinent family history.   Social History    Tobacco Use      Smoking status: Never      Smokeless tobacco: Never    Alcohol use: Not Currently      Comment: over 6 months ago      SYSTEM Check if Physical Exam is Normal If not normal, please explain:   TANIYA [ Kathia Hanson  [ Jeanmarie Holbrook [ Kb Cee [ Abel Carlton [ Mag Phillips [ Maggie Parkerwer      I have discussed the risks and benefits and alternatives of the procedure with the patient/family. They understand and agree to proceed with plan of care. I have reviewed the History and Physical done within the last 30 days. Any changes noted above.   Analy Myers MD  [facility], [facility] - Gastroenterology  10/20/2023  1:03 PM

## 2023-10-20 NOTE — DISCHARGE INSTRUCTIONS

## 2023-10-23 ENCOUNTER — PATIENT MESSAGE (OUTPATIENT)
Dept: GASTROENTEROLOGY | Facility: CLINIC | Age: 38
End: 2023-10-23

## 2023-10-30 ENCOUNTER — TELEPHONE (OUTPATIENT)
Facility: CLINIC | Age: 38
End: 2023-10-30

## 2023-10-30 NOTE — TELEPHONE ENCOUNTER
1st reminder letter sent out via mail and [employer] for the following:   Ferritin (Order #[id number]) on 9/28/23   CBC W Differential W Platelet (Order #[id number]) on 9/28/23   Iron And Tibc (Order #[id number]) on 9/28/23

## 2023-11-13 DIAGNOSIS — F40.00 AGORAPHOBIA: ICD-10-CM

## 2023-11-13 DIAGNOSIS — F41.0 PANIC ATTACK: ICD-10-CM

## 2023-11-13 RX ORDER — ALPRAZOLAM 0.25 MG/1
0.25 TABLET ORAL DAILY PRN
Qty: 30 TABLET | Refills: 0 | Status: SHIPPED | OUTPATIENT
Start: 2023-11-13

## 2023-11-13 NOTE — TELEPHONE ENCOUNTER
Requesting Alprazolam 0.25mg  LOV: 5/12/23 acute  RTC: prn  Last Relevant Labs:   Filled: 6/26/23 #30 with 2 refills    Future Appointments   Date Time Provider Kole Salinas   1/18/2024 11:00 AM Arleth Orozco MD [address] [place] [address] and routed for approval/denial

## 2023-11-28 ENCOUNTER — HOSPITAL ENCOUNTER (OUTPATIENT)
Age: 38
Discharge: HOME | End: 2023-11-28
Payer: MEDICAID

## 2023-11-28 VITALS
WEIGHT: 155 LBS | HEART RATE: 89 BPM | OXYGEN SATURATION: 97 % | BODY MASS INDEX: 27 KG/M2 | TEMPERATURE: 98 F | SYSTOLIC BLOOD PRESSURE: 116 MMHG | RESPIRATION RATE: 18 BRPM | DIASTOLIC BLOOD PRESSURE: 78 MMHG

## 2023-11-28 DIAGNOSIS — U07.1 COVID-19: Primary | ICD-10-CM

## 2023-11-28 LAB
POCT INFLUENZA A: NEGATIVE
POCT INFLUENZA B: NEGATIVE
SARS-COV-2 RNA RESP QL NAA+PROBE: DETECTED

## 2023-11-28 PROCEDURE — 99213 OFFICE O/P EST LOW 20 MIN: CPT

## 2023-11-28 PROCEDURE — 99212 OFFICE O/P EST SF 10 MIN: CPT

## 2023-11-28 PROCEDURE — 87502 INFLUENZA DNA AMP PROBE: CPT | Performed by: EMERGENCY MEDICINE

## 2024-01-15 DIAGNOSIS — F41.0 PANIC ATTACK: ICD-10-CM

## 2024-01-15 DIAGNOSIS — R11.0 NAUSEA: ICD-10-CM

## 2024-01-15 DIAGNOSIS — F40.00 AGORAPHOBIA: ICD-10-CM

## 2024-01-15 NOTE — TELEPHONE ENCOUNTER
Requesting Alprazolam 0.25mg  Last Rx'd 11/13/23 #30 with 0 refills    Requesting Zofran 4mg  Last Rx'd 10/18/23 #30 with 0 refills    Last OV: 5/3/23  RTC: 2 weeks    Future Appointments   Date Time Provider Department Center   3/21/2024 10:30 AM [facility]       Non-protocol med:  Rx pended and routed for approval/denial

## 2024-01-18 RX ORDER — ONDANSETRON 4 MG/1
4 TABLET, ORALLY DISINTEGRATING ORAL EVERY 8 HOURS PRN
Qty: 30 TABLET | Refills: 0 | Status: SHIPPED | OUTPATIENT
Start: 2024-01-18

## 2024-01-18 RX ORDER — ALPRAZOLAM 0.25 MG/1
0.25 TABLET ORAL DAILY PRN
Qty: 30 TABLET | Refills: 0 | Status: SHIPPED | OUTPATIENT
Start: 2024-01-18

## 2024-02-19 ENCOUNTER — PATIENT MESSAGE (OUTPATIENT)
Dept: FAMILY MEDICINE CLINIC | Facility: CLINIC | Age: 39
End: 2024-02-19

## 2024-02-19 NOTE — TELEPHONE ENCOUNTER
From: Rachael Sawyer  To: Carmela Reddy  Sent: 2/19/2024 1:18 PM CST  Subject: Regarding appointment for tomorrow 02/20/24    Hello,    I wanted to reach out and make a small request before    arriving for my appointment tomorrow. I would like to kindly request that I declined not to be weighed in tomorrow due to emotional/mental issues and the fact I have always felt very uncomfortable.     I apologize and dont mean to make a big deal out of this, however in the meantime if someone can make a note of this before my appointment I would really appreciate it. Thank you

## 2024-02-20 ENCOUNTER — OFFICE VISIT (OUTPATIENT)
Dept: FAMILY MEDICINE CLINIC | Facility: CLINIC | Age: 39
End: 2024-02-20
Payer: MEDICAID

## 2024-02-20 ENCOUNTER — LAB ENCOUNTER (OUTPATIENT)
Dept: LAB | Age: 39
End: 2024-02-20
Attending: FAMILY MEDICINE
Payer: MEDICAID

## 2024-02-20 VITALS
TEMPERATURE: 98 F | DIASTOLIC BLOOD PRESSURE: 60 MMHG | HEART RATE: 64 BPM | OXYGEN SATURATION: 99 % | RESPIRATION RATE: 16 BRPM | SYSTOLIC BLOOD PRESSURE: 100 MMHG

## 2024-02-20 DIAGNOSIS — F31.81 BIPOLAR 2 DISORDER (HCC): ICD-10-CM

## 2024-02-20 DIAGNOSIS — D64.9 ANEMIA, UNSPECIFIED TYPE: ICD-10-CM

## 2024-02-20 DIAGNOSIS — N92.1 MENORRHAGIA WITH IRREGULAR CYCLE: ICD-10-CM

## 2024-02-20 DIAGNOSIS — K62.5 RECTAL BLEEDING: ICD-10-CM

## 2024-02-20 DIAGNOSIS — Z00.00 LABORATORY EXAM ORDERED AS PART OF ROUTINE GENERAL MEDICAL EXAMINATION: ICD-10-CM

## 2024-02-20 DIAGNOSIS — Z86.2 HISTORY OF ANEMIA: ICD-10-CM

## 2024-02-20 DIAGNOSIS — M54.50 LUMBAR BACK PAIN: Primary | ICD-10-CM

## 2024-02-20 DIAGNOSIS — F33.1 MODERATE EPISODE OF RECURRENT MAJOR DEPRESSIVE DISORDER (HCC): ICD-10-CM

## 2024-02-20 LAB
ALBUMIN SERPL-MCNC: 4.1 G/DL (ref 3.4–5)
ALBUMIN/GLOB SERPL: 0.9 {RATIO} (ref 1–2)
ALP LIVER SERPL-CCNC: 55 U/L
ALT SERPL-CCNC: 16 U/L
ANION GAP SERPL CALC-SCNC: 5 MMOL/L (ref 0–18)
AST SERPL-CCNC: 15 U/L (ref 15–37)
BASOPHILS # BLD AUTO: 0.05 X10(3) UL (ref 0–0.2)
BASOPHILS NFR BLD AUTO: 1.1 %
BILIRUB SERPL-MCNC: 0.4 MG/DL (ref 0.1–2)
BUN BLD-MCNC: 9 MG/DL (ref 9–23)
CALCIUM BLD-MCNC: 9.5 MG/DL (ref 8.5–10.1)
CHLORIDE SERPL-SCNC: 105 MMOL/L (ref 98–112)
CHOLEST SERPL-MCNC: 198 MG/DL (ref ?–200)
CO2 SERPL-SCNC: 27 MMOL/L (ref 21–32)
CREAT BLD-MCNC: 0.79 MG/DL
DEPRECATED HBV CORE AB SER IA-ACNC: 2 NG/ML
EGFRCR SERPLBLD CKD-EPI 2021: 97 ML/MIN/1.73M2 (ref 60–?)
EOSINOPHIL # BLD AUTO: 0.18 X10(3) UL (ref 0–0.7)
EOSINOPHIL NFR BLD AUTO: 3.8 %
ERYTHROCYTE [DISTWIDTH] IN BLOOD BY AUTOMATED COUNT: 15.4 %
FASTING PATIENT LIPID ANSWER: YES
FASTING STATUS PATIENT QL REPORTED: YES
FOLATE SERPL-MCNC: 11.7 NG/ML (ref 8.7–?)
GLOBULIN PLAS-MCNC: 4.4 G/DL (ref 2.8–4.4)
GLUCOSE BLD-MCNC: 87 MG/DL (ref 70–99)
HCT VFR BLD AUTO: 32 %
HDLC SERPL-MCNC: 48 MG/DL (ref 40–59)
HGB BLD-MCNC: 8.7 G/DL
IMM GRANULOCYTES # BLD AUTO: 0.01 X10(3) UL (ref 0–1)
IMM GRANULOCYTES NFR BLD: 0.2 %
IRON SATN MFR SERPL: 3 %
IRON SERPL-MCNC: 20 UG/DL
LDLC SERPL CALC-MCNC: 122 MG/DL (ref ?–100)
LYMPHOCYTES # BLD AUTO: 1.36 X10(3) UL (ref 1–4)
LYMPHOCYTES NFR BLD AUTO: 28.7 %
MCH RBC QN AUTO: 20.1 PG (ref 26–34)
MCHC RBC AUTO-ENTMCNC: 27.2 G/DL (ref 31–37)
MCV RBC AUTO: 73.9 FL
MONOCYTES # BLD AUTO: 0.35 X10(3) UL (ref 0.1–1)
MONOCYTES NFR BLD AUTO: 7.4 %
NEUTROPHILS # BLD AUTO: 2.79 X10 (3) UL (ref 1.5–7.7)
NEUTROPHILS # BLD AUTO: 2.79 X10(3) UL (ref 1.5–7.7)
NEUTROPHILS NFR BLD AUTO: 58.8 %
NONHDLC SERPL-MCNC: 150 MG/DL (ref ?–130)
OSMOLALITY SERPL CALC.SUM OF ELEC: 282 MOSM/KG (ref 275–295)
PLATELET # BLD AUTO: 400 10(3)UL (ref 150–450)
POTASSIUM SERPL-SCNC: 3.8 MMOL/L (ref 3.5–5.1)
PROT SERPL-MCNC: 8.5 G/DL (ref 6.4–8.2)
RBC # BLD AUTO: 4.33 X10(6)UL
SODIUM SERPL-SCNC: 137 MMOL/L (ref 136–145)
TIBC SERPL-MCNC: 623 UG/DL (ref 240–450)
TRANSFERRIN SERPL-MCNC: 418 MG/DL (ref 200–360)
TRIGL SERPL-MCNC: 158 MG/DL (ref 30–149)
TSI SER-ACNC: 2.32 MIU/ML (ref 0.36–3.74)
VIT B12 SERPL-MCNC: 174 PG/ML (ref 193–986)
VLDLC SERPL CALC-MCNC: 28 MG/DL (ref 0–30)
WBC # BLD AUTO: 4.7 X10(3) UL (ref 4–11)

## 2024-02-20 PROCEDURE — 80053 COMPREHEN METABOLIC PANEL: CPT

## 2024-02-20 PROCEDURE — 82728 ASSAY OF FERRITIN: CPT

## 2024-02-20 PROCEDURE — 82607 VITAMIN B-12: CPT

## 2024-02-20 PROCEDURE — 83540 ASSAY OF IRON: CPT

## 2024-02-20 PROCEDURE — 80061 LIPID PANEL: CPT

## 2024-02-20 PROCEDURE — 84443 ASSAY THYROID STIM HORMONE: CPT

## 2024-02-20 PROCEDURE — 99214 OFFICE O/P EST MOD 30 MIN: CPT | Performed by: FAMILY MEDICINE

## 2024-02-20 PROCEDURE — 36415 COLL VENOUS BLD VENIPUNCTURE: CPT

## 2024-02-20 PROCEDURE — 83550 IRON BINDING TEST: CPT

## 2024-02-20 PROCEDURE — 82746 ASSAY OF FOLIC ACID SERUM: CPT

## 2024-02-20 PROCEDURE — 85025 COMPLETE CBC W/AUTO DIFF WBC: CPT

## 2024-02-20 RX ORDER — MELOXICAM 15 MG/1
15 TABLET ORAL DAILY PRN
Qty: 30 TABLET | Refills: 0 | Status: SHIPPED | OUTPATIENT
Start: 2024-02-20

## 2024-02-20 NOTE — PROGRESS NOTES
[facility] Visit Note  2/20/2024      Subjective:      Patient ID: Rachael Sawyer is a 38 year old female.    Chief Complaint:  Chief Complaint   Patient presents with    Low Back Pain     States she's been having random right sided low back pain that comes & goes for the last 1-2 months. Sometimes when she has this pain she feels weak & nauseated. Has taken Tylenol or Motrin which only helps for a short time.         HPI:  Rachael Sawyer is a 38 year old female who is being seen today for the above.      Today patient declined to have her weight checked because        Back pain- having random right sided low back pain that comes & goes for the last 1-2 months. Gone for a few days, but when back isthre for a few days. Rates pain as 4-6/10. No known triggers. No assoc with position, activity, eating. Sometimes when she has this pain she feels weak & nauseated.   Worsening factors- glass of wine once?   Alleviating factors- Tylenol or Motrin which only helps for a short time.  Havng     Denies- numbness/tingling, leg weakness, saddle anesthesia, loss of bowel/bladder function      Depression/Bipolar d/o-Wanted to go off prozac and off over 2-3mo. Not doing so well now, is hesitant to go back on it.  Says that she will let me know if she decides that she wants to.  I told her she can just give me a call and I will see her 1 to 2 months later.  Patient states that she has not seen a counselor or psychiatrist.  She says that a year and a half ago she looked into this and they said it was $300-$500 just for initial visit.  She does not work at this time which makes it financially difficult and thus no work options for counseling.    Review of Systems - as stated above in the HPI      Objective:     Vitals:    02/20/24 0909   BP: 100/60   Pulse: 64   Resp: 16   Temp: 98.2 °F (36.8 °C)   TempSrc: Temporal   SpO2: 99%       Physical Examination   General:  Alert, in no acute distress  HEENT: NCAT, EOMI, mucus  membranes moist   Neck:  No cervical lymphadenopathy  CV: Regular rate and rhythm. No murmurs, gallops, or rubs.  Lungs:  Clear to auscultation B, no wheezes, rales, or rhonchi, normal respiratory effort  Abd:  +bowel sounds, soft  Ext:  No pedal edema,  Pedal pulses 2+ B  Psych- slightly avoids eye contact, down mood and affect, normal hygiene  MS: normal strength of LE B, FROM of hips B, no tenderness of the spine or paraspinal areas bilaterally except for the right lower lumbar paraspinal region  Neuro: + SLR test on the R, neg on the L normal 2/4 patellar reflexes B      Assessment:     1. Lumbar back pain  - Meloxicam 15 MG Oral Tab; Take 1 tablet (15 mg total) by mouth daily as needed (back pain). Take with food  Dispense: 30 tablet; Refill: 0  - [facility] Referral - In Network  -exercises put on AVS    2. History of anemia  - CBC With Differential With Platelet; Future  - Ferritin; Future  - Folic Acid Serum (Folate); Future  - Vitamin B12; Future  - Iron And Tibc; Future    3. Menorrhagia with irregular cycle  - CBC With Differential With Platelet; Future  - Ferritin; Future  - Folic Acid Serum (Folate); Future  - Vitamin B12; Future  - Iron And Tibc; Future    4. Moderate episode of recurrent major depressive disorder (HCC)  - Assay, Thyroid Stim Hormone; Future    5. Bipolar 2 disorder (HCC)  - [facility] Referral - In Network    6. Laboratory exam ordered as part of routine general medical examination  - CBC With Differential With Platelet; Future  - Comp Metabolic Panel (14); Future  - Lipid Panel; Future        Return for if symptoms worsen/don't improving. Please get fasting labs done.

## 2024-02-21 ENCOUNTER — PATIENT MESSAGE (OUTPATIENT)
Dept: FAMILY MEDICINE CLINIC | Facility: CLINIC | Age: 39
End: 2024-02-21

## 2024-02-22 NOTE — TELEPHONE ENCOUNTER
From: Rachael Sawyer  To: Carmela Reddy  Sent: 2/21/2024 10:39 AM CST  Subject: Blood Test Results      Hi Dr Reddy,    I was just reading most of my blood tests results, I noticed there were certain different tests that came out below normal or higher than usual. Is there cause for concern about some of the blood tests that came out below average?

## 2024-02-28 ENCOUNTER — TELEMEDICINE (OUTPATIENT)
Dept: FAMILY MEDICINE CLINIC | Facility: CLINIC | Age: 39
End: 2024-02-28
Payer: MEDICAID

## 2024-02-28 DIAGNOSIS — M54.50 ACUTE RIGHT-SIDED LOW BACK PAIN WITHOUT SCIATICA: ICD-10-CM

## 2024-02-28 DIAGNOSIS — E61.1 IRON DEFICIENCY: ICD-10-CM

## 2024-02-28 DIAGNOSIS — E53.8 VITAMIN B 12 DEFICIENCY: ICD-10-CM

## 2024-02-28 DIAGNOSIS — N92.1 MENORRHAGIA WITH IRREGULAR CYCLE: ICD-10-CM

## 2024-02-28 DIAGNOSIS — D50.9 MICROCYTIC ANEMIA: Primary | ICD-10-CM

## 2024-02-28 PROCEDURE — 99214 OFFICE O/P EST MOD 30 MIN: CPT | Performed by: FAMILY MEDICINE

## 2024-02-28 RX ORDER — FERROUS SULFATE 325(65) MG
325 TABLET ORAL
Qty: 90 TABLET | Refills: 0 | Status: SHIPPED | OUTPATIENT
Start: 2024-02-28

## 2024-02-28 RX ORDER — CYANOCOBALAMIN 1000 UG/ML
1000 INJECTION, SOLUTION INTRAMUSCULAR; SUBCUTANEOUS WEEKLY
Status: SHIPPED | OUTPATIENT
Start: 2024-02-28 | End: 2024-03-27

## 2024-02-28 NOTE — PROGRESS NOTES
Video Visit- [facility]  This is a telemedicine visit with live, interactive video and audio.     Rachael Sawyer understands and accepts financial responsibility for any deductible, co-insurance and/or co-pays associated with this service for the date of 02/28/24.      Duration of the service: 30 minutes  3:10-3:40PM      Chief Complaint   Patient presents with    Anemia     Lab results        HPI:  Microcytic anemia-  Hb 8.7  Low energy, mild sob 2 x.  Periods last 3-10d. Periods are irregular can go q 1-3mo. Has to double up on pads when sits/lays down. On her heaviest day goes through 10-11pads/d. +dime-quarter sized clots.  Using tranexamic acid prn when really heavy.  Sees a gyneDr. Smith. Was on an OCP but gets headaches on multiple trials of different ones.    Vit b12 defic- 174 on 2/20/24. Eats veggies ok, no h/o gastric bypass.    Not a vegetarian  No family h/o blood d/o like thalasemia, sickle cell.  Not a frequent blood donor.    Denies- epistaxis, hematemesis, hemoptysis, melena, hematochezia         Back pain- meloxicam helped a tiny bit, but not much. I forgot to give her exercises at the last visit so we will attach those to her after visit summary today.  Informed her that if her pain is not improving in the next 6 to 8 weeks and she should see me again and we will pursue imaging at that time.    History 2/20/24: having random right sided low back pain that comes & goes for the last 1-2 months. Gone for a few days, but when back isthre for a few days. Rates pain as 4-6/10. No known triggers. No assoc with position, activity, eating. Sometimes when she has this pain she feels weak & nauseated.   Worsening factors- glass of wine once?   Alleviating factors- Tylenol or Motrin which only helps for a short time.  Havng      Denies- numbness/tingling, leg weakness, saddle anesthesia, loss of bowel/bladder function             Past Medical History:    Diagnosis Date    Anxiety     Chronic constipation     Depression     IBS (irritable bowel syndrome)     Menorrhagia with irregular cycle 02/13/2023    Ovarian cyst     Polycystic ovarian syndrome     PONV (postoperative nausea and vomiting)        Past Surgical History:   Procedure Laterality Date    COLONOSCOPY  03/2011    COLONOSCOPY  10/20/2023    poor prep, no bx, Dr. Obrien    COLONOSCOPY N/A 10/20/2023    Procedure: COLONOSCOPY;  Surgeon: Heron Obrien MD;  Location: [facility] ENDOSCOPY    D & C      EGD  03/2011    EGD  10/20/2023    chronic inactive gastritis, Dr. Obrien       No family history on file.        Physical Exam:  LMP 01/14/2024 (Approximate)     GENERAL APPEARANCE:  Alert, no acute distress, appears stated age  HEENT:  NCAT, EOMI, mucus membranes moist  NECK:  No obvious goiter  PULMONARY:  Speaking in full sentences comfortably, normal work of breathing  ABDOMEN:  not obese  MUSCULOSKELETAL: Sitting upright.   NEUROLOGIC:  Cranial nerves 2-12 grossly intact.  PSYCHIATRIC:  Normal mood, affect, and hygiene.        Assessment/Plan:  1. Microcytic anemia  - Oncology/Hematology Referral - [facility])  - Ferrous Sulfate 325 (65 Fe) MG Oral Tab; Take 1 tablet (325 mg total) by mouth daily with breakfast.  Dispense: 90 tablet; Refill: 0    2. Iron deficiency    3. Menorrhagia with irregular cycle  - OBG Referral - In Network  - Oncology/Hematology Referral - [facility])  - Ferrous Sulfate 325 (65 Fe) MG Oral Tab; Take 1 tablet (325 mg total) by mouth daily with breakfast.  Dispense: 90 tablet; Refill: 0    4. Vitamin B 12 deficiency  - cyanocobalamin (Vitamin B12) 1000 MCG/ML injection 1,000 mcg    5. Acute right-sided low back pain without sciatica   -Gave exercises through the after visit summary which she will look at online.        Return in about 2 months (around 4/28/2024) for f/u back pain and anemia.      Carmela Reddy MD      Please note that the following visit  was completed using two-way, real-time interactive audio and visual communication. This has been done in good jm to provide continuity of care in the best interest of the provider-patient relationship, due to the ongoing public health crisis/national emergency and because of restrictions of visitation. There are limitations of this visit as physical examination was limited.  Appropriate medical decision-making and tests are ordered as detailed in the plan of care above.

## 2024-03-11 ENCOUNTER — APPOINTMENT (OUTPATIENT)
Dept: HEMATOLOGY/ONCOLOGY | Facility: HOSPITAL | Age: 39
End: 2024-03-11
Attending: INTERNAL MEDICINE
Payer: MEDICAID

## 2024-03-20 NOTE — PROGRESS NOTES
[facility] Amb Video Visit    Parent/Caregiver verbally consents to a Virtual/Telephone Check-In service on 03/23/20.  Patient understands and accepts financial responsibility for any deductible, co-insurance and/or co-pays associated with this service. Please note that the following visit was completed using two-way, real-time interactive audio and video communication.     Duration of the service: 17 minutes face to face time    Rachael Sawyer is a 38 year old female here today for a telemedicine/video visit.  HPI:   Rachael Sawyer is a 38 year old woman with history of BMI 30, D&C, bipolar disorder, agoraphobia, depression, panic attack, dysmenorrhea, constipation, PCOS, IBS, here for follow up     Was seen initially in July 2021 for abdominal pain and constipation. Said she had had constipation all her life and had some more recently in the prior 7 months or so and abdominal pain. Said bowel movements were anywhere from 1-6 days. Noted using miralax and metamucil sometimes. Straining sometimes. No abdominal surgeries, C section, vaginal deliveries. Said she also had had right sided abdominal pain most days per week which would come and go associated with bowel habits. Noted had had bowel movements regularly the prior 2 days and feeling well. No vomiting, blood in the stool, unintentional weight loss. Hadn't taken anything for pain because she wasn't sure what to try. No family history of colorectal cancer. She also noted some improvement in symptoms with a gluten free diet.     Seen in January 2022 saying she had been doing ok in the Fall but in the prior 1-2 months had worsened again with more constipation. Sometimes going a week without bowel movements. Had felt like miralax and metamucil had helped but stopped them because she was doing better. Thought she did have bisacodyl once that gave her a reaction. Often having to strain and feeling incompletely evacuated. Was  taking IBGard 1-2 times a day which helped some though sometimes still getting abdominal flare discomfort and not sure what to take. Eating ok. No vomiting. No bleeding. Weight stable in the prior several months. Working on adjusting mood disorder medications with her primary physician.     Seen last in March 2022 doing ok. Had however been set back in the last 1-2 months. Tried senna which helped a bit initially but then not so much. Had used suppositories but tried not to because they are uncomfortable. Also had been seeing some blood more recently, which she had had every so often.    Seen in September 2023 for follow up. At that time said she had improved to some degree for sometime but had been feeling worse in the prior several months with similar abdominal pain and constipation. Said she did try the linzess for a week but felt worse on it with diarrhea. Had been using miralax with limited help still having bowel movements sometimes after days to a week at a time. Noted altered menses sometimes not having periods for a couple months and then sometimes having very heavy periods having to wear an adult diaper.    Had been doing better for some months but feels like in the last 1-2 months having recurrence of constipation. Tried trulance for 1 week and felt ok and then felt worse because she was having the runs and sick to her stomach. So stopped and felt better. Feeling weak and dizzy more lately.  Says she has not been on any iron lately but her primary care physician did refer her to a specialist to consider IV iron.  She notes she has not tried anything else new lately over-the-counter although she does note that MiraLAX and Metamucil have helped but she has been hesitant and take them on a regular basis.    Current Outpatient Medications   Medication Sig Dispense Refill    Ferrous Sulfate 325 (65 Fe) MG Oral Tab Take 1 tablet (325 mg total) by mouth daily with breakfast. 90 tablet 0    Meloxicam 15 MG Oral  Tab Take 1 tablet (15 mg total) by mouth daily as needed (back pain). Take with food 30 tablet 0    ondansetron 4 MG Oral Tablet Dispersible Take 1 tablet (4 mg total) by mouth every 8 (eight) hours as needed for Nausea. 30 tablet 0    ALPRAZolam 0.25 MG Oral Tab Take 1 tablet (0.25 mg total) by mouth daily as needed (panic attack). Needs appt for further refills 30 tablet 0    tranexamic acid 650 MG Oral Tab Take 2 tablets (1,300 mg total) by mouth 3 (three) times daily as needed. For up to the first 5 days of your period (Patient not taking: Reported on 11/28/2023) 30 tablet 4      Past Medical History:   Diagnosis Date    Anxiety     Chronic constipation     Depression     IBS (irritable bowel syndrome)     Menorrhagia with irregular cycle 02/13/2023    Ovarian cyst     Polycystic ovarian syndrome     PONV (postoperative nausea and vomiting)      Past Surgical History:   Procedure Laterality Date    COLONOSCOPY  03/2011    COLONOSCOPY  10/20/2023    poor prep, no bx, Dr. Obrien    COLONOSCOPY N/A 10/20/2023    Procedure: COLONOSCOPY;  Surgeon: Heron Obrien MD;  Location: [facility] ENDOSCOPY    D & C      EGD  03/2011    EGD  10/20/2023    chronic inactive gastritis, Dr. Obrien       Social History:  Social History     Socioeconomic History    Marital status: Single   Tobacco Use    Smoking status: Never    Smokeless tobacco: Never   Vaping Use    Vaping Use: Never used   Substance and Sexual Activity    Alcohol use: Not Currently     Comment: over 6 months ago    Drug use: Never    Sexual activity: Yes     Partners: Male   Other Topics Concern    Caffeine Concern Yes     Comment: 1x cup daily    Exercise Yes     Comment: walk daily    Seat Belt Yes   Social History Narrative    ** Merged History Encounter **             Physical exam:  General:awake, cooperative, no acute distress  HEENT: EOMI, no scleral icterus, MMM; oral pharnyx is without exudates or lesions  CV: RRR  Resp: non-labored breathing  Abd:  non-distended  Ext: no lower extremity swelling  Neuro: Alert, Oriented X 3  Skin: no rashes, bruises  Psych: normal affect    ROS:   Review of systems were negative except as noted in the HPI    ASSESSMENT AND PLAN:   Rachael Sawyer is a 38 year old woman with history of BMI 30, D&C, bipolar disorder, agoraphobia, depression, panic attack, dysmenorrhea, constipation, PCOS, IBS, here for follow up     At her prior visits, review of the chart noted an EGD and colonoscopy in 2011 at [facility] for generalized abdominal pain and constipation with findings of irregular Z line, small hiatus hernia, mild gastritis, stool in the entire examined colon, congested mucosa of the descending colon. There were also multiple noted encounters in care everywhere for multiple gastrointestinal complaints including constipation and abdominal pain. Labs available noted at her visit in July 2021 included a normal CBC and LFTs from December 2020 and TSH from January 2021. Imaging noted included a CT of the abdomen/pelvis from December 2020 for abdominal pain with findings of large amount of stool in the colon as well as note of there is an area of caliber change of the colon without focal mass in the left paramedian abdomen at the level the mid descending colon and there appears to be some tenting of mesentery and slight swirling of the vessels in this region.  This was though to possibly represent an incomplete internal hernia.  No absolute obstruction noted. She saw Dr. Long (colorectal surgery at [place]) in May 2021 for abdominal pain and constipation. Barium enema was recommended when she was in the ED at [place] in December 2021 but not completed. Colonoscopy was recommended at her office visit in May 2021. Review of her CT images noted a large amount of stool in the RLQ.     At her visit in July 2021, it was discussed her symptoms were most consistent with IBS and/or chronic idiopathic constipation which we discussed and  initial management including increase kiwi in the diet, fiber supplement, acetaminophen prn. Discussed consideration of further evaluation of the abnormality on CT with possibilities including colonoscopy, flexible sigmoidoscopy, lower GI study, and CT and after discussion would try to evaluate further with CT. A repeat CT A/P 7/20/21 was completed and showed some non specific images of the mesentery similar to December 2020 and were not thought to be causative of her symptoms and to consider re-evaluation with the surgeon she saw. More recent evaluation noted in the EMR included an abdominal X ray 12/27/21 with findings of large stool burden     At her visit in March 2022 we again discussed her symptoms which had improved and now worsened in the last 1-2 months again suspected to be related to IBS and chronic idiopathic constipation. She was noted to have failed multiple available therapies in multiple therapeutic classes including magnesium citrate, polyethylene glycol, fiber supplements, senna, and over the counter suppositories. It was recommended to start linaclotide. Rectal bleeding was likely hemorrhoidal statistically though given time since last colonoscopy, repeating her colonoscopy was recommended. She cancelled and never rescheduled.    At her visit in September 2023,  was noted to have undergone two more CT scans of the abdomen and pelvis in April 2022 for abdominal pain which was unrevealing and February 2023 for abdominal pain with findings of findings of a dominant follicle or cyst of the right ovary and myometrial cyst or degenerated fibroid of the right uterine body. Labs from that year included normal LFTs from February. Hemoglobin 2/17/23 is 8.1. We discussed recommendation for repeating her labs (CBC and iron studies) given her last hemoglobin. Possibly related to her periods but given the rectal bleeding (suspected to be hemorrhoidal) and prior recommendation for colonoscopy, discussed  recommendation for colonoscopy for evaluation as well as EGD at that time. We again discussed that the pain and constipation are suspected to be chronic idiopathic constipation and/or IBS-C.     She underwent EGD and colonoscopy October 2023 with an unremarkable upper endoscopy and limited colonoscopy due to poor and inadequate bowel preparation.    Today we again discussed her symptoms which are consistent with her known chronic idiopathic constipation and/or irritable bowel syndrome with constipation.  Which is uncontrolled at this time.  Most recently unsuccessful with a couple prescription medications for this.  She does note some improvement with MiraLAX and Metamucil however has been hesitant to take these regularly.  I did encourage her to use these regularly over time I think should help improve her symptoms.  I also encouraged her to consider the IV iron with the specialist her primary care physician referred her to.  We did review her last colonoscopy and the suboptimal bowel preparation which is likely attributed to her underlying gastrointestinal issues as she did follow the instructions appropriately but will likely require enhanced bowel preparation in the future which we will discuss.     Recommend:  -regular miralax and metamucil  -see hematologist to discuss IV iron  -limit constipating meds  -timing of repeat colonoscopy to be determined  -follow up in 3-6 months    Heron Obrien MD  [facility] - Gastroenterology  3/21/2024

## 2024-03-21 ENCOUNTER — TELEMEDICINE (OUTPATIENT)
Dept: GASTROENTEROLOGY | Facility: CLINIC | Age: 39
End: 2024-03-21

## 2024-03-21 ENCOUNTER — TELEPHONE (OUTPATIENT)
Facility: CLINIC | Age: 39
End: 2024-03-21

## 2024-03-21 ENCOUNTER — TELEPHONE (OUTPATIENT)
Dept: GASTROENTEROLOGY | Facility: CLINIC | Age: 39
End: 2024-03-21

## 2024-03-21 DIAGNOSIS — K58.1 IRRITABLE BOWEL SYNDROME WITH CONSTIPATION: ICD-10-CM

## 2024-03-21 DIAGNOSIS — K59.04 CHRONIC IDIOPATHIC CONSTIPATION: Primary | ICD-10-CM

## 2024-03-21 PROCEDURE — 99213 OFFICE O/P EST LOW 20 MIN: CPT | Performed by: INTERNAL MEDICINE

## 2024-03-21 NOTE — TELEPHONE ENCOUNTER
Pt is scheduled for an appointment today at 10:30.  She is requesting appointment be changed to a Video Visit.  Please call

## 2024-03-21 NOTE — TELEPHONE ENCOUNTER
Sure that is fine    Thanks    MD Adam [facility] - Gastroenterology  3/21/2024  8:27 AM

## 2024-03-21 NOTE — TELEPHONE ENCOUNTER
Attempt calling patient,no answer,left voicemail through her cell phone in regards of a follow up has been made for 6 months per  on September 5th @10:00 Am at [facility] Location.If patient decided to changed the date and time .Left details messages on her voicemail is patient changed her mind about this date. Will try calling patient back to confirm this.

## 2024-04-05 ENCOUNTER — TELEPHONE (OUTPATIENT)
Dept: FAMILY MEDICINE CLINIC | Facility: CLINIC | Age: 39
End: 2024-04-05

## 2024-04-05 NOTE — TELEPHONE ENCOUNTER
Patient scheduled for b12 on 4/9/24.    Per Telemedicine visit 2/28/24, ok to start b12 injections due to vitamin b12 deficiency on labs 2/20/24.

## 2024-04-05 NOTE — TELEPHONE ENCOUNTER
Future Appointments   Date Time Provider Department Center   4/9/2024  9:00 AM [facility] 20 NURSE EM[facility] 20 [facility] [address]   4/15/2024 11:00 AM María Velázquez, MARGARITAW [facility]   9/5/2024 10:00 [facility]

## 2024-04-09 ENCOUNTER — NURSE ONLY (OUTPATIENT)
Dept: FAMILY MEDICINE CLINIC | Facility: CLINIC | Age: 39
End: 2024-04-09
Payer: MEDICAID

## 2024-04-09 DIAGNOSIS — E53.8 VITAMIN B 12 DEFICIENCY: Primary | ICD-10-CM

## 2024-04-09 PROCEDURE — 96372 THER/PROPH/DIAG INJ SC/IM: CPT | Performed by: FAMILY MEDICINE

## 2024-04-09 RX ORDER — CYANOCOBALAMIN 1000 UG/ML
1000 INJECTION, SOLUTION INTRAMUSCULAR; SUBCUTANEOUS ONCE
Status: COMPLETED | OUTPATIENT
Start: 2024-04-09 | End: 2024-04-09

## 2024-04-09 RX ADMIN — CYANOCOBALAMIN 1000 MCG: 1000 INJECTION, SOLUTION INTRAMUSCULAR; SUBCUTANEOUS at 09:09:00

## 2024-04-09 NOTE — PROGRESS NOTES
Pt here for b12 weekly #1. Given IM in R deltoid. Pt tolerated well with no adverse events. Pt left office in stable condition.    Next injection due in 1 week  Future Appointments   Date Time Provider Department Center   4/15/2024 11:00 AM María Velázquez, MARGARITAW [facility]   4/16/2024  9:00 AM EM[facility] 20 NURSE EMG 20 EMG [address]   4/25/2024  2:00 PM Mathew Torres MD PF HEM [facility] [place]   9/5/2024 10:00 AM [facility]

## 2024-04-12 DIAGNOSIS — F40.00 AGORAPHOBIA: ICD-10-CM

## 2024-04-12 DIAGNOSIS — F41.0 PANIC ATTACK: ICD-10-CM

## 2024-04-13 ENCOUNTER — OFFICE VISIT (OUTPATIENT)
Dept: FAMILY MEDICINE CLINIC | Facility: CLINIC | Age: 39
End: 2024-04-13
Payer: MEDICAID

## 2024-04-13 VITALS
DIASTOLIC BLOOD PRESSURE: 66 MMHG | OXYGEN SATURATION: 97 % | HEIGHT: 63 IN | BODY MASS INDEX: 27 KG/M2 | SYSTOLIC BLOOD PRESSURE: 100 MMHG | RESPIRATION RATE: 16 BRPM | TEMPERATURE: 98 F | HEART RATE: 76 BPM

## 2024-04-13 DIAGNOSIS — R10.9 ABDOMINAL PAIN, UNSPECIFIED ABDOMINAL LOCATION: Primary | ICD-10-CM

## 2024-04-13 DIAGNOSIS — R39.89 URINARY PROBLEM: ICD-10-CM

## 2024-04-13 LAB
APPEARANCE: CLEAR
BILIRUBIN: NEGATIVE
GLUCOSE (URINE DIPSTICK): NEGATIVE MG/DL
KETONES (URINE DIPSTICK): NEGATIVE MG/DL
LEUKOCYTES: NEGATIVE
MULTISTIX LOT#: ABNORMAL NUMERIC
NITRITE, URINE: NEGATIVE
PH, URINE: 7 (ref 4.5–8)
PROTEIN (URINE DIPSTICK): NEGATIVE MG/DL
SPECIFIC GRAVITY: 1.01 (ref 1–1.03)
URINE-COLOR: YELLOW
UROBILINOGEN,SEMI-QN: 0.2 MG/DL (ref 0–1.9)

## 2024-04-13 PROCEDURE — 99213 OFFICE O/P EST LOW 20 MIN: CPT | Performed by: NURSE PRACTITIONER

## 2024-04-13 PROCEDURE — 87086 URINE CULTURE/COLONY COUNT: CPT | Performed by: NURSE PRACTITIONER

## 2024-04-13 PROCEDURE — 81003 URINALYSIS AUTO W/O SCOPE: CPT | Performed by: NURSE PRACTITIONER

## 2024-04-13 NOTE — PROGRESS NOTES
CHIEF COMPLAINT:     Chief Complaint   Patient presents with    Urinary Symptoms     Also having issues with my right ear and want to get that looked at - Entered by patient  x 2 weeks, this last week, minor blood, right lower back and abdominal pain       HPI:   Rachael Sawyer is a 38 year old female who presents with symptoms of UTI. Complaining of blood on toliet paper after voiding and lower back and abdominal pain, concerned about UTI or kidney infection.  Denies flank pain, fever, nausea, or vomiting.  Denies vaginal discharge or itching, new sexual partners in the last 3 months, or recent unprotected sexual intercourse. Also had tinnitus in left ear yesterday.     Current Outpatient Medications   Medication Sig Dispense Refill    ondansetron 4 MG Oral Tablet Dispersible Take 1 tablet (4 mg total) by mouth every 8 (eight) hours as needed for Nausea. 30 tablet 0    ALPRAZolam 0.25 MG Oral Tab Take 1 tablet (0.25 mg total) by mouth daily as needed (panic attack). Needs appt for further refills 30 tablet 0    Ferrous Sulfate 325 (65 Fe) MG Oral Tab Take 1 tablet (325 mg total) by mouth daily with breakfast. (Patient not taking: Reported on 4/13/2024) 90 tablet 0    Meloxicam 15 MG Oral Tab Take 1 tablet (15 mg total) by mouth daily as needed (back pain). Take with food (Patient not taking: Reported on 4/13/2024) 30 tablet 0    tranexamic acid 650 MG Oral Tab Take 2 tablets (1,300 mg total) by mouth 3 (three) times daily as needed. For up to the first 5 days of your period (Patient not taking: Reported on 11/28/2023) 30 tablet 4      Past Medical History:    Anxiety    Chronic constipation    Depression    IBS (irritable bowel syndrome)    Menorrhagia with irregular cycle    Ovarian cyst    Polycystic ovarian syndrome    PONV (postoperative nausea and vomiting)      Social History:  Social History     Socioeconomic History    Marital status: Single   Tobacco Use    Smoking status: Never    Smokeless tobacco:  Never   Vaping Use    Vaping status: Never Used   Substance and Sexual Activity    Alcohol use: Not Currently     Comment: over 6 months ago    Drug use: Never    Sexual activity: Yes     Partners: Male   Other Topics Concern    Caffeine Concern Yes     Comment: 1x cup daily    Exercise Yes     Comment: walk daily    Seat Belt Yes   Social History Narrative    ** Merged History Encounter **              REVIEW OF SYSTEMS:   GENERAL: Denies fever, chills, or body aches  SKIN: no rashes, no skin wounds or ulcers.  EYES:denies blurred vision or double vision  HEENT: no congestion, rhinorrhea, sore throat. Reports tinnitus in left ear yesterday, now ringing is gone.   CARDIOVASCULAR: denies chest pain or palpitations  LUNGS: denies shortness of breath, cough, or wheezing  GI: See HPI. No N/V/C/D.   : See HPI.      EXAM:   /66   Pulse 76   Temp 97.7 °F (36.5 °C) (Oral)   Resp 16   Ht 5' 3\" (1.6 m)   LMP 01/14/2024 (Approximate)   SpO2 97%   BMI 27.46 kg/m²   GENERAL: well developed, well nourished,in no apparent distress  CARDIO: RRR, no murmurs  LUNGS: clear to ausculation bilaterally, no wheezing or rhonchi  GI: BS present x 4.  No hepatosplenomegaly. No guarding or rebound tenderness.  : no suprapubic tenderness, bladder distention, or CVAT   BACK: no tenderness over mid, or lower back with deep palpation.   EARS: 5 mm pink colored foreign object removed from left ear canal.   Recent Results (from the past 24 hour(s))   URINALYSIS, AUTO, W/O SCOPE    Collection Time: 04/13/24 11:59 AM   Result Value Ref Range    Glucose Urine Negative Negative mg/dL    Bilirubin Urine Negative Negative    Ketones, UA Negative Negative - Trace mg/dL    Spec Gravity 1.010 1.005 - 1.030    Blood Urine Small (A) Negative    PH Urine 7.0 5.0 - 8.0    Protein Urine Negative Negative - Trace mg/dL    Urobilinogen Urine 0.2 0.2 - 1.0 mg/dL    Nitrite Urine Negative Negative    Leukocyte Esterase Urine Negative Negative     APPEARANCE clear Clear    Color Urine Yellow Yellow    Mult[employer]ix Lot# [id number] Numeric    Multistix Expiration Date 8/31/24 Date         ASSESSMENT AND PLAN:   Rachael Sawyer is a 38 year old female who presents with UTI symptoms. U/A; + only for Blood: Small, will send culture. Pt reports tinnitus sensation yesterday, however no longer present at this time, 5 mm foreign object removed from canal without difficulty.     ASSESSMENT:  Encounter Diagnoses   Name Primary?    Abdominal pain, unspecified abdominal location Yes    Urinary problem        PLAN:  Due to minimal abnormalities in U/A will send culture and treat per results.   Follow up in 2-3 days if symptoms do not improve or worsen.    Return to clinic or see your primary care provider immediately if you experience nausea, vomiting, or fever.   Please remember that illnesses can change quickly, and although it is not felt that your symptoms currently require further treatment at the ER if you symptoms do worsen, change or if new symptoms were to develop please seek emergent care at the nearest ER.

## 2024-04-15 RX ORDER — ALPRAZOLAM 0.25 MG/1
0.25 TABLET ORAL DAILY PRN
Qty: 30 TABLET | Refills: 0 | Status: SHIPPED | OUTPATIENT
Start: 2024-04-15

## 2024-04-15 NOTE — TELEPHONE ENCOUNTER
Requesting Alprazolam 0.25mg  Last OV: 2/28/24 VV  RTC: 2 months  Last Rx'd 1/18/24 #30 with 0 refills    Future Appointments   Date Time Provider Department Center   4/25/2024  2:00 PM Mathew Torres MD PF HEM ON[facility] [place]   9/5/2024 10:00 AM Heron Obrien MD [facility]  [place]     Controlled med:  Rx pended and routed for approval/denial

## 2024-04-25 ENCOUNTER — APPOINTMENT (OUTPATIENT)
Dept: HEMATOLOGY/ONCOLOGY | Age: 39
End: 2024-04-25
Attending: INTERNAL MEDICINE
Payer: MEDICAID

## 2024-04-25 ENCOUNTER — NURSE ONLY (OUTPATIENT)
Dept: FAMILY MEDICINE CLINIC | Facility: CLINIC | Age: 39
End: 2024-04-25
Payer: MEDICAID

## 2024-04-25 DIAGNOSIS — E53.8 VITAMIN B 12 DEFICIENCY: Primary | ICD-10-CM

## 2024-04-25 PROCEDURE — 96372 THER/PROPH/DIAG INJ SC/IM: CPT | Performed by: FAMILY MEDICINE

## 2024-04-25 RX ORDER — CYANOCOBALAMIN 1000 UG/ML
1000 INJECTION, SOLUTION INTRAMUSCULAR; SUBCUTANEOUS ONCE
Status: COMPLETED | OUTPATIENT
Start: 2024-04-25 | End: 2024-04-25

## 2024-04-25 RX ADMIN — CYANOCOBALAMIN 1000 MCG: 1000 INJECTION, SOLUTION INTRAMUSCULAR; SUBCUTANEOUS at 10:26:00

## 2024-04-25 NOTE — CONSULTS
[facility] Report of Consultation    Patient Name: Rachael Sawyer   YOB: 1985   Medical Record Number: [id number]   CSN: [id number]   Consulting Physician: Mathew Torres M.D.   Referring Physician: Carmela Reddy    Date of Consultation: 2024     Reason for Consultation (Chief Complaint):  No chief complaint on file.       History of Present Illness:  Rachael Sawyer is a 38 year old female ***    Past Medical History:  Past Medical History:    Anxiety    Chronic constipation    Depression    IBS (irritable bowel syndrome)    Menorrhagia with irregular cycle    Ovarian cyst    Polycystic ovarian syndrome    PONV (postoperative nausea and vomiting)       Past Surgical History:  Past Surgical History:   Procedure Laterality Date    Colonoscopy  2011    Colonoscopy  10/20/2023    poor prep, no bx, Dr. Obrien    Colonoscopy N/A 10/20/2023    Procedure: COLONOSCOPY;  Surgeon: Heron Obrien MD;  Location: [facility] ENDOSCOPY    D & c      Egd  2011    Egd  10/20/2023    chronic inactive gastritis, Dr. Obrien       Gynecologic History:  OB History    Para Term  AB Living   1 0 0 0 1 0   SAB IAB Ectopic Multiple Live Births   1 0 0 0 0       Family History:  No family history on file.    Social History:  Social History     Socioeconomic History    Marital status: Single     Spouse name: Not on file    Number of children: Not on file    Years of education: Not on file    Highest education level: Not on file   Occupational History    Not on file   Tobacco Use    Smoking status: Never    Smokeless tobacco: Never   Vaping Use    Vaping status: Never Used   Substance and Sexual Activity    Alcohol use: Not Currently     Comment: over 6 months ago    Drug use: Never    Sexual activity: Yes     Partners: Male   Other Topics Concern     Service Not Asked    Blood Transfusions Not Asked    Caffeine Concern Yes     Comment: 1x cup daily    Occupational Exposure  Not Asked    Hobby Hazards Not Asked    Sleep Concern Not Asked    Stress Concern Not Asked    Weight Concern Not Asked    Special Diet Not Asked    Back Care Not Asked    Exercise Yes     Comment: walk daily    Bike Helmet Not Asked    Seat Belt Yes    Self-Exams Not Asked   Social History Narrative    ** Merged History Encounter **          Social Determinants of Health     Financial Resource Strain: Not on file   Food Insecurity: Not on file   Transportation Needs: Not on file   Physical Activity: Not on file   Stress: Not on file   Social Connections: Not on file   Housing Stability: Not on file       Current Medications:    Current Outpatient Medications:     ALPRAZolam 0.25 MG Oral Tab, Take 1 tablet (0.25 mg total) by mouth daily as needed (panic attack). Needs appt for further refills, 2nd time done, Disp: 30 tablet, Rfl: 0    ondansetron 4 MG Oral Tablet Dispersible, Take 1 tablet (4 mg total) by mouth every 8 (eight) hours as needed for Nausea., Disp: 30 tablet, Rfl: 0    Allergies:  Allergies   Allergen Reactions    Sulfa Antibiotics HIVES    Ciprofloxacin HALLUCINATION     + dizzy        Review of Systems:  ***    Vital Signs:  [facility] 01/14/2024 (Approximate)     Performance Status:  ***    Physical Examination:    Constitutional Normal - Alert, cooperative, oriented. Mood and affect appropriate. Appears close to chronological age. Well nourished. Well developed.   Eyes Normal - Conjunctivae and sclerae are clear and without icterus. Pupils are reactive and equal.   ENMT Normal - Sinuses are nontender.  No oral exudates, ulcers, masses, thrush or mucositis. Oropharynx clear.  Tongue normal.   Neck Normal - Supple without masses or thyromegaly.   Hematologic/Lymphatic Normal - No petechiae or purpura.  No tender or palpable lymph nodes in the cervical, supraclavicular, axillary or inguinal area.   Respiratory Normal - Lungs are clear to auscultation without rhonchi or wheezing.   Cardiovascular Normal -  Regular rate and rhythm of heart without murmurs, gallops or rubs.   Abdomen Normal - Non-tender, non-distended, no masses, ascites or hepatosplenomegaly. Good bowel sounds. No guarding or rebound tenderness. No pulsatile masses.   Extremities Normal - No visible deformities, no cyanosis, clubbing or edema. Pulses 4+ and equal bilaterally.   Integumentary Normal - No rashes, scars, or lesions suggestive of malignancy.   Neurologic Normal - No sensory or motor deficits, normal cerebellar function, normal gait, cranial nerves intact.   Psychiatric Normal - Alert and oriented times three. Coherent speech. Verbalizes understanding of our discussions today.       Laboratory:      Latest Reference Range & Units 02/20/24 10:09   Sodium 136 - 145 mmol/L 137   Potassium 3.5 - 5.1 mmol/L 3.8   Chloride 98 - 112 mmol/L 105   Carbon Dioxide, Total 21.0 - 32.0 mmol/L 27.0   BUN 9 - 23 mg/dL 9   CREATININE 0.55 - 1.02 mg/dL 0.79   CALCIUM 8.5 - 10.1 mg/dL 9.5   EGFR >=60 mL/min/1.73m2 97   ANION GAP 0 - 18 mmol/L 5   CALCULATED OSMOLALITY 275 - 295 mOsm/kg 282   ALKALINE PHOSPHATASE 37 - 98 U/L 55   AST (SGOT) 15 - 37 U/L 15   ALT (SGPT) 13 - 56 U/L 16   Total Bilirubin 0.1 - 2.0 mg/dL 0.4   Globulin 2.8 - 4.4 g/dL 4.4      Latest Reference Range & Units 02/20/24 10:09   Iron, Serum 50 - 170 ug/dL 20 (L)   Transferrin 200 - 360 mg/dL 418 (H)   Iron Bind.Cap.(TIBC) 240 - 450 ug/dL 623 (H)   Iron Saturation 15 - 50 % 3 (L)   FERRITIN 12.0 - 240.0 ng/mL 2.0 (L)   Vitamin B12 193 - 986 pg/mL 174 (L)   FOLATE (FOLIC ACID), SERUM >=8.7 ng/mL 11.7   (L): Data is abnormally low  (H): Data is abnormally high   Latest Reference Range & Units 02/20/24 10:09   A/G Ratio 1.0 - 2.0  0.9 (L)   PROTEIN, TOTAL 6.4 - 8.2 g/dL 8.5 (H)   Albumin 3.4 - 5.0 g/dL 4.1   (L): Data is abnormally low  (H): Data is abnormally high   Latest Reference Range & Units 02/20/24 10:09   WBC 4.0 - 11.0 x10(3) uL 4.7   Hemoglobin 12.0 - 16.0 g/dL 8.7 (L)    Hematocrit 35.0 - 48.0 % 32.0 (L)   Platelet Count 150.0 - 450.0 10(3)uL 400.0   RBC 3.80 - 5.30 x10(6)uL 4.33   MCH 26.0 - 34.0 pg 20.1 (L)   MCHC 31.0 - 37.0 g/dL 27.2 (L)   MCV 80.0 - 100.0 fL 73.9 (L)   RDW % 15.4   Prelim Neutrophil Abs 1.50 - 7.70 x10 (3) uL 2.79   Neutrophils Absolute 1.50 - 7.70 x10(3) uL 2.79   Lymphocytes Absolute 1.00 - 4.00 x10(3) uL 1.36   Monocytes Absolute 0.10 - 1.00 x10(3) uL 0.35   Eosinophils Absolute 0.00 - 0.70 x10(3) uL 0.18   Basophils Absolute 0.00 - 0.20 x10(3) uL 0.05   Immature Granulocyte Absolute 0.00 - 1.00 x10(3) uL 0.01   Neutrophils % % 58.8   Lymphocytes % % 28.7   Monocytes % % 7.4   Eosinophils % % 3.8   Basophils % % 1.1   Immature Granulocyte % % 0.2   (L): Data is abnormally low           Radiology:    Pathology:    Impression and Plan:  ***    Planned Follow Up:  ***    I spent *** minutes face to face with the patient.  More than 50% of that time was spent counseling the patient and/or on coordination of care.  The diagnosis, prognosis, and general treatment was explained to the patient and the family.    Risk Level: High    Electronically Signed by:    Mathew Torres M.D.  [facility]

## 2024-04-25 NOTE — PROGRESS NOTES
Pt here for b12 injection (weekly #2). Given IM in R deltoid. Pt tolerated well with no adverse events. Pt left office in stable condition.    Next injection due in 1 week  Future Appointments   Date Time Provider Department Center   5/2/2024 10:30 AM [facility] 20 NURSE [facility] 20 [facility] [address]   9/5/2024 10:00 AM [facility]

## 2024-04-29 ENCOUNTER — TELEPHONE (OUTPATIENT)
Dept: OBGYN CLINIC | Facility: CLINIC | Age: 39
End: 2024-04-29

## 2024-04-29 NOTE — TELEPHONE ENCOUNTER
Called pt today for the 2nd time to figure out if this is a gyne problem visit (exam) or if its a physical per her pt notes. LVMTCB to clarify    Future Appointments   Date Time Provider Department Center   5/2/2024 10:30 AM EM[facility] 20 NURSE EM[facility] 20 [facility] [address]   6/11/2024 10:00 AM Sienna Goodwin APRN [facility] OB/GYN P [facility] [address]   9/5/2024 10:00 AM [facility]

## 2024-06-18 DIAGNOSIS — R11.0 NAUSEA: ICD-10-CM

## 2024-06-18 DIAGNOSIS — E61.1 IRON DEFICIENCY: Primary | ICD-10-CM

## 2024-06-18 DIAGNOSIS — F40.00 AGORAPHOBIA: ICD-10-CM

## 2024-06-18 DIAGNOSIS — F41.0 PANIC ATTACK: ICD-10-CM

## 2024-06-18 DIAGNOSIS — E53.8 VITAMIN B 12 DEFICIENCY: ICD-10-CM

## 2024-06-19 RX ORDER — ALPRAZOLAM 0.25 MG
0.25 TABLET ORAL DAILY PRN
Qty: 15 TABLET | Refills: 0 | Status: SHIPPED | OUTPATIENT
Start: 2024-06-19 | End: 2024-07-26

## 2024-06-19 NOTE — TELEPHONE ENCOUNTER
Requesting Alprazolam 0.25mg  Last OV: 2/28/24 Telemedicine  RTC: 2 months  Last Rx'd 4/15/24 #30 with 0 refills    Future Appointments   Date Time Provider Department Center   9/5/2024 10:00 AM Heron Obrien MD [facility]  [place]       Per [place] , last dispensed 4/15/24 #30    Controlled med:  Rx pended and routed for approval/denial

## 2024-06-19 NOTE — TELEPHONE ENCOUNTER
Please inform patient that she is overdue to recheck her anemia labs which have been ordered and should follow-up for this and her anxiety for further refills.

## 2024-06-21 NOTE — TELEPHONE ENCOUNTER
Requesting Ondansetron 4mg  Last OV: 2/20/24  RTC: prn  Last Rx'd 1/18/24 #30 with 0 refills    Future Appointments   Date Time Provider Department Center   9/5/2024 10:00 AM [facility]       Non-protocol med:  Rx pended and routed for approval/denial

## 2024-06-22 RX ORDER — ONDANSETRON 4 MG/1
4 TABLET, ORALLY DISINTEGRATING ORAL EVERY 8 HOURS PRN
Qty: 30 TABLET | Refills: 0 | Status: SHIPPED | OUTPATIENT
Start: 2024-06-22

## 2024-07-16 DIAGNOSIS — F41.0 PANIC ATTACK: ICD-10-CM

## 2024-07-16 DIAGNOSIS — F40.00 AGORAPHOBIA: ICD-10-CM

## 2024-07-17 RX ORDER — ALPRAZOLAM 0.25 MG/1
0.25 TABLET ORAL DAILY PRN
Qty: 15 TABLET | Refills: 0 | OUTPATIENT
Start: 2024-07-17

## 2024-07-17 NOTE — TELEPHONE ENCOUNTER
Requesting Alprazolam 0.25mg  Last OV: 2/20/24  RTC: prn  Last Rx'd 6/19/24 #15 with 0 refills    Future Appointments   Date Time Provider Department Center   9/5/2024 10:00 AM [facility] [place]       Per [place] , last dispensed 6/19/24 #14    Controlled med:  Rx pended and routed for approval/denial

## 2024-07-25 NOTE — TELEPHONE ENCOUNTER
See other TE 11/23/21 [Normal] : supple, no neck mass and thyroid not enlarged [Normal Supraclavicular Lymph Nodes] : normal supraclavicular lymph nodes [Normal Axillary Lymph Nodes] : normal axillary lymph nodes [Normal] : oriented to person, place and time, with appropriate affect [de-identified] : small incisional hernia at the umbilical incision.

## 2024-07-26 ENCOUNTER — TELEMEDICINE (OUTPATIENT)
Dept: FAMILY MEDICINE CLINIC | Facility: CLINIC | Age: 39
End: 2024-07-26
Payer: MEDICAID

## 2024-07-26 DIAGNOSIS — D50.9 MICROCYTIC ANEMIA: ICD-10-CM

## 2024-07-26 DIAGNOSIS — K59.09 CHRONIC CONSTIPATION: Primary | ICD-10-CM

## 2024-07-26 DIAGNOSIS — R53.83 FATIGUE, UNSPECIFIED TYPE: ICD-10-CM

## 2024-07-26 DIAGNOSIS — F33.1 MODERATE EPISODE OF RECURRENT MAJOR DEPRESSIVE DISORDER (HCC): ICD-10-CM

## 2024-07-26 DIAGNOSIS — F31.81 BIPOLAR 2 DISORDER (HCC): ICD-10-CM

## 2024-07-26 DIAGNOSIS — F40.00 AGORAPHOBIA: ICD-10-CM

## 2024-07-26 DIAGNOSIS — F41.0 PANIC ATTACK: ICD-10-CM

## 2024-07-26 DIAGNOSIS — E53.8 VITAMIN B 12 DEFICIENCY: ICD-10-CM

## 2024-07-26 DIAGNOSIS — Z12.31 ENCOUNTER FOR SCREENING MAMMOGRAM FOR MALIGNANT NEOPLASM OF BREAST: ICD-10-CM

## 2024-07-26 RX ORDER — ALPRAZOLAM 0.25 MG/1
0.25 TABLET ORAL DAILY PRN
Qty: 30 TABLET | Refills: 0 | Status: SHIPPED | OUTPATIENT
Start: 2024-07-26

## 2024-07-26 RX ORDER — SENNOSIDES 8.6 MG
8.6 TABLET ORAL 2 TIMES DAILY PRN
Qty: 20 TABLET | Refills: 0 | Status: SHIPPED | OUTPATIENT
Start: 2024-07-26

## 2024-07-26 NOTE — PROGRESS NOTES
Video Visit- [facility]  This is a telemedicine visit with live, interactive video and audio.     Rachael Sawyer understands and accepts financial responsibility for any deductible, co-insurance and/or co-pays associated with this service for the date of 07/26/24.      Duration of the service: 27 minutes  2:41-3:08PM      Chief Complaint   Patient presents with    Medication Follow-Up        HPI:  Stomach issues- In the last month. Lower abdominal pain.  +chronic constipation worse lately. Takes IBGuard, metamucil, and miralax. Sometimes it helps, other times it doesn't help at all. Can't see Dr. Obrien until Sept.  Last appt with him was 3/21/24 and they discussed potentially repeating a c-scope b/c last one she was not fully cleared out.   Can go up to 10 d in b/w BM's but gets very uncomfortable. Usually 1-7d though  Has used a pink pill (dulcolax) and felt like she had the stomach flu, but for hours had a lot of pain/abd cramping, and nausea. Never tried another laxative since then.  Never used an enema.        Anxiety-  was taking alprazolam at night and would take it then. Gets anxious when worried about what tomorrow is going to be like.   Not currently on a maintenance medications. Was on fluoxetine 10mg, but got off of it b/c she was feeling better and didn't want to be on a med for long-term. Last script should have been out August 2023.  Has a lot of personal and family issues right now.   She forgot to renew her insurance, so it was without for a few weeks. Was seeing María was good for 4-5 visits and had 3 leftover.           Microcytic anemia- Microcytic anemia-  Hb 8.7  Low energy, mild sob 2 x.  Periods last 3-10d. Periods are irregular can go q 1-3mo. Has to double up on pads when sits/lays down. On her heaviest day goes through 10-11pads/d. +dime-quarter sized clots.  Using tranexamic acid prn when really heavy.  Sees a gyneDr. Smith. Was on an  OCP but gets headaches on multiple trials of different ones.     Vit b12 defic- 174 on 2/20/24. Eats veggies ok, no h/o gastric bypass.     Not a vegetarian  No family h/o blood d/o like thalasemia, sickle cell.  Not a frequent blood donor.     Denies- epistaxis, hematemesis, hemoptysis, melena, hematochezia      Weak/fatigued  - dxed with moderate microcytic anemia with both iron defic and vit b 12 defic. Hb 8.7 on 2/20/24 with ferritin 2.0, TIBC 623.    Vit B 12 defic- low in 2/24 and started vit b 12 shots, but then got interrupted. Only did 2 shots.   Gets worried sometimes as she is weak or completely winded and worried about passing out.     History 2/28/24: Low energy, mild sob 2 x.  Periods last 3-10d. Periods are irregular can go q 1-3mo. Has to double up on pads when sits/lays down. On her heaviest day goes through 10-11pads/d. +dime-quarter sized clots.  Using tranexamic acid prn when really heavy.  Sees a gyneDr. Smith. Was on an OCP but gets headaches on multiple trials of different ones.     Vit b12 defic- 174 on 2/20/24. Eats veggies ok, no h/o gastric bypass.     Not a vegetarian  No family h/o blood d/o like thalasemia, sickle cell.  Not a frequent blood donor.     Denies- epistaxis, hematemesis, hemoptysis, melena, hematochezia      Past Medical History:    Anxiety    Chronic constipation    Depression    IBS (irritable bowel syndrome)    Menorrhagia with irregular cycle    Ovarian cyst    Polycystic ovarian syndrome    PONV (postoperative nausea and vomiting)       Past Surgical History:   Procedure Laterality Date    Colonoscopy  03/2011    Colonoscopy  10/20/2023    poor prep, no bx, Dr. Obrien    Colonoscopy N/A 10/20/2023    Procedure: COLONOSCOPY;  Surgeon: Heron Obrien MD;  Location: [facility] ENDOSCOPY    D & c      Egd  03/2011    Egd  10/20/2023    chronic inactive gastritis, Dr. Obrien       No family history on file.        Physical Exam:  LMP 01/14/2024 (Approximate)      GENERAL APPEARANCE:  Alert, no acute distress, appears stated age  HEENT:  NCAT, EOMI, mucus membranes moist  NECK:  No obvious goiter  PULMONARY:  Speaking in full sentences comfortably, normal work of breathing  ABDOMEN:  not obese  MUSCULOSKELETAL: Sitting upright.   NEUROLOGIC:  Cranial nerves 2-12 grossly intact.  PSYCHIATRIC:  Normal mood, affect, and hygiene      Assessment/Plan:  1. Chronic constipation  - sennosides 8.6 MG Oral Tab; Take 1 tablet (8.6 mg total) by mouth 2 (two) times daily as needed (severe constipation).  Dispense: 20 tablet; Refill: 0  -may take laxative prn  -may add colace    2. Encounter for screening mammogram for malignant neoplasm of breast  - [facility] JOJO 2D+3D SCREENING BILAT (CPT=77067/72107); Future    3. Agoraphobia  - ALPRAZolam 0.25 MG Oral Tab; Take 1 tablet (0.25 mg total) by mouth daily as needed (panic attack).  Dispense: 30 tablet; Refill: 0    4. Panic attack  - ALPRAZolam 0.25 MG Oral Tab; Take 1 tablet (0.25 mg total) by mouth daily as needed (panic attack).  Dispense: 30 tablet; Refill: 0    5. Microcytic anemia  - CBC With Differential With Platelet; Future  - Ferritin; Future  - Folic Acid Serum (Folate); Future  - Iron And Tibc; Future  - Vitamin B12; Future    6. Vitamin B 12 deficiency  - CBC With Differential With Platelet; Future  - Vitamin B12; Future    7. Fatigue, unspecified type  - CBC With Differential With Platelet; Future  - Comp Metabolic Panel (14); Future  - Ferritin; Future  - Folic Acid Serum (Folate); Future  - Iron And Tibc; Future  - Vitamin B12; Future    8. Moderate episode of recurrent major depressive disorder (HCC)    9. Bipolar 2 disorder (HCC)  -Over the video we initially talked about restarting her fluoxetine 10 mg however when I realized that she also has bipolar disorder I called back to see if she would be more open to something such as Seroquel if this would help with her bipolar disorder but also can help with her difficulty  sleeping at nighttime.  I also discussed that sometimes if you use an SSRI alone this could bring about a manic episode and I did not want to do more harm than good.  She can send a message through [employer] with what she would like to do if you want to start the Seroquel I would start her on Seroquel XR 50 mg 1 tablet at night #30 no refills and see your within 2 to 4 weeks.  If she is uncomfortable with this idea she should be having make an appointment for 2 weeks and we can talk about this further then.              Return in about 2 weeks (around 8/9/2024) for f/u sob/weaknesss & in 2 mo for anxiety/depression.      Carmela Reddy MD      Please note that the following visit was completed using two-way, real-time interactive audio and visual communication. This has been done in good jm to provide continuity of care in the best interest of the provider-patient relationship, due to the ongoing public health crisis/national emergency and because of restrictions of visitation. There are limitations of this visit as physical examination was limited.  Appropriate medical decision-making and tests are ordered as detailed in the plan of care above.

## 2024-09-14 ENCOUNTER — HOSPITAL ENCOUNTER (EMERGENCY)
Age: 39
Discharge: LEFT BEFORE DISCHARGE | End: 2024-09-14
Payer: MEDICAID

## 2024-09-14 VITALS
WEIGHT: 160 LBS | DIASTOLIC BLOOD PRESSURE: 78 MMHG | HEART RATE: 68 BPM | TEMPERATURE: 98 F | BODY MASS INDEX: 28 KG/M2 | RESPIRATION RATE: 18 BRPM | OXYGEN SATURATION: 98 % | SYSTOLIC BLOOD PRESSURE: 101 MMHG

## 2024-09-14 LAB — SARS-COV-2 RNA RESP QL NAA+PROBE: NOT DETECTED

## 2024-09-17 ENCOUNTER — PATIENT MESSAGE (OUTPATIENT)
Dept: FAMILY MEDICINE CLINIC | Facility: CLINIC | Age: 39
End: 2024-09-17

## 2024-09-17 ENCOUNTER — TELEMEDICINE (OUTPATIENT)
Dept: FAMILY MEDICINE CLINIC | Facility: CLINIC | Age: 39
End: 2024-09-17
Payer: MEDICAID

## 2024-09-17 DIAGNOSIS — R21 RASH AND NONSPECIFIC SKIN ERUPTION: Primary | ICD-10-CM

## 2024-09-17 PROCEDURE — 99213 OFFICE O/P EST LOW 20 MIN: CPT | Performed by: FAMILY MEDICINE

## 2024-09-17 RX ORDER — METHYLPREDNISOLONE 4 MG
TABLET, DOSE PACK ORAL
Qty: 1 EACH | Refills: 0 | Status: SHIPPED | OUTPATIENT
Start: 2024-09-17

## 2024-09-17 NOTE — PROGRESS NOTES
Video Visit- [facility]  This is a telemedicine visit with live, interactive video and audio.     Rachael Sawyer understands and accepts financial responsibility for any deductible, co-insurance and/or co-pays associated with this service for the date of 09/17/24.      Duration of the service: 13 minutes  3:33-3:46PM      No chief complaint on file.       HPI:  Skin lesion- X 2 wks. Noticed bites and then it was itchy skin everywhere. Ankles/feet covered in scratches and bruising. Can be of the arms as well.  Friday didn't feel well. Felt feverish, but didn't have a thermometer.   Itching gradually resolved but still intermittently itchy. 2 d ago mostly gone.  She looked up hives and didn't think they look the same. Says they weren't blisters.    No new antibiotics or meds  Wasn't in the woods, but takes dog to the local dog park.  Has taken an otc antihistamine and creams and still feels she needs greater help.            Past Medical History:    Anxiety    Chronic constipation    Depression    IBS (irritable bowel syndrome)    Menorrhagia with irregular cycle    Ovarian cyst    Polycystic ovarian syndrome    PONV (postoperative nausea and vomiting)       Past Surgical History:   Procedure Laterality Date    Colonoscopy  03/2011    Colonoscopy  10/20/2023    poor prep, no bx, Dr. Obrien    Colonoscopy N/A 10/20/2023    Procedure: COLONOSCOPY;  Surgeon: Heron Obrien MD;  Location: [facility] ENDOSCOPY    D & c      Egd  03/2011    Egd  10/20/2023    chronic inactive gastritis, Dr. Obrien       No family history on file.        Physical Exam:  LMP 09/01/2024 (Approximate)     GENERAL APPEARANCE:  Alert, no acute distress, appears stated age  HEENT:  NCAT, EOMI, mucus membranes moist  NECK:  No obvious goiter  PULMONARY:  Speaking in full sentences comfortably, normal work of breathing  ABDOMEN:  + not/obese  MUSCULOSKELETAL: Sitting upright.   NEUROLOGIC:   Cranial nerves 2-12 grossly intact.  PSYCHIATRIC:  Normal mood, affect, and hygiene.  SKIN: lower legs ankles, feet with scratches and multiple and few ecchymoses mostly instep of R foot. No active bites/hives or other lesions.         Assessment/Plan:  1. Rash and nonspecific skin eruption  - methylPREDNISolone (MEDROL) 4 MG Oral Tablet Therapy Pack; As directed.  Dispense: 1 each; Refill: 0  -favor allergy as the itchy became all over, not just over areas of \"bites\".   -to keep mental note of new exposures or associations.  -if not helping/returns and to ask for allergy referral. (Dr. Negrete)  -discussed may cont antihistamine, oatmeal and cooler baths, ice packs, pat dry/no rubbing, etc.      Return for if symptoms worsen/don't improve.      Carmela Reddy MD      Please note that the following visit was completed using two-way, real-time interactive audio and visual communication. This has been done in good jm to provide continuity of care in the best interest of the provider-patient relationship, due to the ongoing public health crisis/national emergency and because of restrictions of visitation. There are limitations of this visit as physical examination was limited.  Appropriate medical decision-making and tests are ordered as detailed in the plan of care above.

## 2024-09-18 NOTE — TELEPHONE ENCOUNTER
Future Appointments   Date Time Provider Department Center   9/30/2024  8:40 AM Carmela Reddy MD EM[facility] 20 EM[facility] [address]

## 2024-09-18 NOTE — TELEPHONE ENCOUNTER
From: Rachael Sawyer  To: Carmela Reddy  Sent: 9/17/2024 4:23 PM CDT  Subject: Another question regarding health    I had another question that I wanted to ask you which i forgot. This topic has to do about my weight. Over the years my weight has fluctuated between gaining and losing over the years. Prior to 2020, I was at my heaviest at close to 200Ibs which was not good. Then I got sick later that year and throughout that year/ half were I dropped an increasing about of weight due to stomach flare ups and maintaining a dairy free and gluten free diet. Still to this day remain on the gluten free diet and work out. Weight is 160-170. Was wondering if I can possibly look into getting on a weight loss medication to help with maintaining a healthy weight so I can feel overall better.

## 2024-09-26 DIAGNOSIS — F41.0 PANIC ATTACK: ICD-10-CM

## 2024-09-26 DIAGNOSIS — F40.00 AGORAPHOBIA: ICD-10-CM

## 2024-09-30 RX ORDER — ALPRAZOLAM 0.25 MG
0.25 TABLET ORAL DAILY PRN
Qty: 30 TABLET | Refills: 0 | Status: SHIPPED | OUTPATIENT
Start: 2024-09-30

## 2024-09-30 NOTE — TELEPHONE ENCOUNTER
Requesting Alprazolam 0.25mg  Last OV: 9/17/24  RTC: prn  Last Rx'd 7/26/24 #30 with 0 refills    Future Appointments   Date Time Provider Department Center   10/2/2024 11:00 AM Carmela Reddy MD [facility] 20 EM[facility] [address]       Per [place] , last dispensed 7/26/24 #30    Controlled med:  Rx pended and routed for approval/denial

## 2024-11-02 DIAGNOSIS — F40.00 AGORAPHOBIA: ICD-10-CM

## 2024-11-02 DIAGNOSIS — F41.0 PANIC ATTACK: ICD-10-CM

## 2024-11-04 NOTE — TELEPHONE ENCOUNTER
Requesting Alprazolam 0.25mg  Last OV: 9/17/24  RTC: prn  Last Rx'd 9/30/24 #30 with 0 refills    No future appointments.        Controlled med:  Rx pended and routed for approval/denial

## 2024-11-05 RX ORDER — ALPRAZOLAM 0.25 MG/1
0.25 TABLET ORAL DAILY PRN
Qty: 30 TABLET | Refills: 0 | Status: SHIPPED | OUTPATIENT
Start: 2024-11-05

## 2024-11-07 ENCOUNTER — TELEPHONE (OUTPATIENT)
Dept: FAMILY MEDICINE CLINIC | Facility: CLINIC | Age: 39
End: 2024-11-07

## 2024-11-07 NOTE — TELEPHONE ENCOUNTER
Received incoming fax from pharmacy on Alprazolam, needs PA. Will place fax in MA folder Key:[id number]

## 2024-11-07 NOTE — TELEPHONE ENCOUNTER
No PA required per Glenna at [facility], she states patient is only allowed #14 tablets at a time per her insurance.          Medication Dispense Information    ALPRAZOLAM 0.25 MG TABS   Dispensed: 11/5/2024 12:00 AM   Written: 9/30/2024   Unit strength: 0.25 Undefined   Unit form: Misc   Days supply: 14   Quantity: 14 tablet   Pharmacy: [place] [address] [address] [phone], [phone]  [facility] [address]  [facility] [id number]  Phone: [phone]  Fax: [phone]   Authorizing provider: Carmela Reddy MD

## 2024-12-08 DIAGNOSIS — R11.0 NAUSEA: ICD-10-CM

## 2024-12-08 DIAGNOSIS — K59.09 CHRONIC CONSTIPATION: ICD-10-CM

## 2024-12-12 RX ORDER — DOCUSATE SODIUM AND SENNOSIDES 50; 8.6 MG/1; MG/1
1 TABLET ORAL 2 TIMES DAILY PRN
COMMUNITY
Start: 2024-07-26

## 2024-12-12 RX ORDER — SENNOSIDES 8.6 MG
8.6 TABLET ORAL 2 TIMES DAILY PRN
Qty: 20 TABLET | Refills: 0 | Status: SHIPPED | OUTPATIENT
Start: 2024-12-12

## 2024-12-12 RX ORDER — ONDANSETRON 4 MG/1
4 TABLET, ORALLY DISINTEGRATING ORAL EVERY 8 HOURS PRN
Qty: 30 TABLET | Refills: 0 | Status: SHIPPED | OUTPATIENT
Start: 2024-12-12

## 2024-12-12 NOTE — TELEPHONE ENCOUNTER
Requesting Ondansetron 4mg  Last Rx'd 6/22/24 #30 with 0 refills    Requesting Sennosides 8.6mg  Last Rx'd 7/26/24 #20 with 0 refills    Last OV: 9/17/24 Telemedicine  RTC: prn    No future appointments.    Non-protocol med:  Rx pended and routed for approval/denial

## 2024-12-22 DIAGNOSIS — F41.0 PANIC ATTACK: ICD-10-CM

## 2024-12-22 DIAGNOSIS — F40.00 AGORAPHOBIA: ICD-10-CM

## 2024-12-22 RX ORDER — DOCUSATE SODIUM AND SENNOSIDES 50; 8.6 MG/1; MG/1
1 TABLET ORAL 2 TIMES DAILY PRN
Qty: 60 TABLET | Refills: 1 | Status: SHIPPED | OUTPATIENT
Start: 2024-12-22

## 2024-12-22 RX ORDER — ALPRAZOLAM 0.25 MG/1
0.25 TABLET ORAL DAILY PRN
Qty: 30 TABLET | Refills: 0 | Status: SHIPPED | OUTPATIENT
Start: 2024-12-22

## 2025-05-26 DIAGNOSIS — R11.0 NAUSEA: ICD-10-CM

## 2025-05-27 NOTE — TELEPHONE ENCOUNTER
Requesting Ondansetron 4mg  Last OV: 9/17/24 Telemedicine  RTC: prn  Last Rx'd 12/12/24 #30 with 0 refills    Future Appointments   Date Time Provider Department Center   5/28/2025  9:00 AM Carmela Reddy MD [facility] 20 [facility] [address]       Non-protocol med:  Rx pended and routed for approval/denial

## 2025-05-28 ENCOUNTER — LAB ENCOUNTER (OUTPATIENT)
Dept: LAB | Age: 40
End: 2025-05-28
Attending: FAMILY MEDICINE
Payer: MEDICAID

## 2025-05-28 ENCOUNTER — OFFICE VISIT (OUTPATIENT)
Dept: FAMILY MEDICINE CLINIC | Facility: CLINIC | Age: 40
End: 2025-05-28
Payer: MEDICAID

## 2025-05-28 VITALS
WEIGHT: 155 LBS | RESPIRATION RATE: 16 BRPM | SYSTOLIC BLOOD PRESSURE: 100 MMHG | HEART RATE: 60 BPM | BODY MASS INDEX: 27.46 KG/M2 | OXYGEN SATURATION: 99 % | TEMPERATURE: 98 F | DIASTOLIC BLOOD PRESSURE: 60 MMHG | HEIGHT: 63 IN

## 2025-05-28 DIAGNOSIS — L29.9 PRURITUS: ICD-10-CM

## 2025-05-28 DIAGNOSIS — R53.83 FATIGUE, UNSPECIFIED TYPE: Primary | ICD-10-CM

## 2025-05-28 DIAGNOSIS — Z12.31 ENCOUNTER FOR SCREENING MAMMOGRAM FOR MALIGNANT NEOPLASM OF BREAST: ICD-10-CM

## 2025-05-28 DIAGNOSIS — N92.1 MENORRHAGIA WITH IRREGULAR CYCLE: ICD-10-CM

## 2025-05-28 DIAGNOSIS — E61.1 IRON DEFICIENCY: ICD-10-CM

## 2025-05-28 DIAGNOSIS — Z00.00 LABORATORY EXAM ORDERED AS PART OF ROUTINE GENERAL MEDICAL EXAMINATION: ICD-10-CM

## 2025-05-28 DIAGNOSIS — R53.83 FATIGUE, UNSPECIFIED TYPE: ICD-10-CM

## 2025-05-28 DIAGNOSIS — L21.9 SEBORRHEIC DERMATITIS: ICD-10-CM

## 2025-05-28 LAB
ALBUMIN SERPL-MCNC: 4.8 G/DL (ref 3.2–4.8)
ALBUMIN/GLOB SERPL: 1.6 {RATIO} (ref 1–2)
ALP LIVER SERPL-CCNC: 40 U/L (ref 37–98)
ALT SERPL-CCNC: <7 U/L (ref 10–49)
ANION GAP SERPL CALC-SCNC: 9 MMOL/L (ref 0–18)
AST SERPL-CCNC: 17 U/L (ref ?–34)
BASOPHILS # BLD AUTO: 0.04 X10(3) UL (ref 0–0.2)
BASOPHILS NFR BLD AUTO: 0.9 %
BILIRUB SERPL-MCNC: 0.4 MG/DL (ref 0.3–1.2)
BUN BLD-MCNC: 7 MG/DL (ref 9–23)
CALCIUM BLD-MCNC: 9.5 MG/DL (ref 8.7–10.6)
CHLORIDE SERPL-SCNC: 106 MMOL/L (ref 98–112)
CHOLEST SERPL-MCNC: 175 MG/DL (ref ?–200)
CO2 SERPL-SCNC: 25 MMOL/L (ref 21–32)
CREAT BLD-MCNC: 0.96 MG/DL (ref 0.55–1.02)
DEPRECATED HBV CORE AB SER IA-ACNC: <1 NG/ML (ref 50–306)
EGFRCR SERPLBLD CKD-EPI 2021: 76 ML/MIN/1.73M2 (ref 60–?)
EOSINOPHIL # BLD AUTO: 0.1 X10(3) UL (ref 0–0.7)
EOSINOPHIL NFR BLD AUTO: 2.3 %
ERYTHROCYTE [DISTWIDTH] IN BLOOD BY AUTOMATED COUNT: 16.7 %
FASTING PATIENT LIPID ANSWER: YES
FASTING STATUS PATIENT QL REPORTED: YES
FOLATE SERPL-MCNC: 10 NG/ML (ref 5.4–?)
GLOBULIN PLAS-MCNC: 3 G/DL (ref 2–3.5)
GLUCOSE BLD-MCNC: 92 MG/DL (ref 70–99)
HCT VFR BLD AUTO: 29.1 % (ref 35–48)
HDLC SERPL-MCNC: 54 MG/DL (ref 40–59)
HGB BLD-MCNC: 8.1 G/DL (ref 12–16)
IMM GRANULOCYTES # BLD AUTO: 0.01 X10(3) UL (ref 0–1)
IMM GRANULOCYTES NFR BLD: 0.2 %
IRON SATN MFR SERPL: 2 % (ref 15–50)
IRON SERPL-MCNC: 11 UG/DL (ref 50–170)
LDLC SERPL CALC-MCNC: 103 MG/DL (ref ?–100)
LYMPHOCYTES # BLD AUTO: 1.21 X10(3) UL (ref 1–4)
LYMPHOCYTES NFR BLD AUTO: 27.6 %
MCH RBC QN AUTO: 20.4 PG (ref 26–34)
MCHC RBC AUTO-ENTMCNC: 27.8 G/DL (ref 31–37)
MCV RBC AUTO: 73.1 FL (ref 80–100)
MONOCYTES # BLD AUTO: 0.27 X10(3) UL (ref 0.1–1)
MONOCYTES NFR BLD AUTO: 6.2 %
NEUTROPHILS # BLD AUTO: 2.75 X10 (3) UL (ref 1.5–7.7)
NEUTROPHILS # BLD AUTO: 2.75 X10(3) UL (ref 1.5–7.7)
NEUTROPHILS NFR BLD AUTO: 62.8 %
NONHDLC SERPL-MCNC: 121 MG/DL (ref ?–130)
OSMOLALITY SERPL CALC.SUM OF ELEC: 288 MOSM/KG (ref 275–295)
PLATELET # BLD AUTO: 336 10(3)UL (ref 150–450)
POTASSIUM SERPL-SCNC: 3.9 MMOL/L (ref 3.5–5.1)
PROT SERPL-MCNC: 7.8 G/DL (ref 5.7–8.2)
RBC # BLD AUTO: 3.98 X10(6)UL (ref 3.8–5.3)
SODIUM SERPL-SCNC: 140 MMOL/L (ref 136–145)
T4 FREE SERPL-MCNC: 1.2 NG/DL (ref 0.8–1.7)
TOTAL IRON BINDING CAPACITY: 490 UG/DL (ref 250–425)
TRANSFERRIN SERPL-MCNC: 406 MG/DL (ref 250–380)
TRIGL SERPL-MCNC: 96 MG/DL (ref 30–149)
TSI SER-ACNC: 1.85 UIU/ML (ref 0.55–4.78)
VIT B12 SERPL-MCNC: 156 PG/ML (ref 211–911)
VIT D+METAB SERPL-MCNC: 17.2 NG/ML (ref 30–100)
VLDLC SERPL CALC-MCNC: 16 MG/DL (ref 0–30)
WBC # BLD AUTO: 4.4 X10(3) UL (ref 4–11)

## 2025-05-28 PROCEDURE — 82306 VITAMIN D 25 HYDROXY: CPT

## 2025-05-28 PROCEDURE — 85025 COMPLETE CBC W/AUTO DIFF WBC: CPT

## 2025-05-28 PROCEDURE — 99214 OFFICE O/P EST MOD 30 MIN: CPT | Performed by: FAMILY MEDICINE

## 2025-05-28 PROCEDURE — 80061 LIPID PANEL: CPT

## 2025-05-28 PROCEDURE — 83540 ASSAY OF IRON: CPT

## 2025-05-28 PROCEDURE — 84443 ASSAY THYROID STIM HORMONE: CPT

## 2025-05-28 PROCEDURE — 80053 COMPREHEN METABOLIC PANEL: CPT

## 2025-05-28 PROCEDURE — 82728 ASSAY OF FERRITIN: CPT

## 2025-05-28 PROCEDURE — 82746 ASSAY OF FOLIC ACID SERUM: CPT

## 2025-05-28 PROCEDURE — 36415 COLL VENOUS BLD VENIPUNCTURE: CPT

## 2025-05-28 PROCEDURE — 84439 ASSAY OF FREE THYROXINE: CPT

## 2025-05-28 PROCEDURE — 83550 IRON BINDING TEST: CPT

## 2025-05-28 PROCEDURE — 82607 VITAMIN B-12: CPT

## 2025-05-28 RX ORDER — KETOCONAZOLE 20 MG/ML
SHAMPOO, SUSPENSION TOPICAL
Qty: 120 ML | Refills: 0 | Status: SHIPPED | OUTPATIENT
Start: 2025-05-29

## 2025-05-28 RX ORDER — ONDANSETRON 4 MG/1
4 TABLET, ORALLY DISINTEGRATING ORAL EVERY 8 HOURS PRN
Qty: 30 TABLET | Refills: 0 | Status: SHIPPED | OUTPATIENT
Start: 2025-05-28

## 2025-05-28 NOTE — PROGRESS NOTES
[facility] Visit Note  5/28/2025      Subjective:      Patient ID: Rachael Sawyer is a 39 year old female.    Chief Complaint:  Chief Complaint   Patient presents with    Tired     States she's been feeling tired & weak off & on for couple of months.    Itching     States having itching on different areas of her skin, deshawn her scalp.       HPI:  Rachael Sawyer is a 39 year old female who is being seen today for the above.       Fatigue- been feeling tired & weak off & on for couple of months. Has a h/o microcytic anemia with her last Hb being 8.7 on 2/20/24. Doesn't snore. Getting 6-7hr sleep/night.  LMP- 5/15/25, Went without a period from Dec to April. Has been like this off and on again.   Periods are very heavy to the point she can't go anywhere otherwise she will have blood gushes.     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   Still gets nauseous from time to time.     Not a vegetarian  No family h/o blood d/o like thalasemia, sickle cell.  Not a frequent blood donor.    Denies- epistaxis, hematemesis, hemoptysis, melena, hematochezia, hematuria      Boyfriend has had to file an order of protection for defamation of character form other and has been a nightmare.    Last year:  Low energy, mild sob 2 x.  Periods last 3-10d. Periods are irregular can go q 1-3mo. Has to double up on pads when sits/lays down. On her heaviest day goes through 10-11pads/d. +dime-quarter sized clots.  Using tranexamic acid prn when really heavy.  Sees a Dr. Sarah rosales. Was on an OCP but gets headaches on multiple trials of different ones.       H/o Vit B 12 defic- low at 174 on 2/20/24 and started vit b 12 shots, but then got interrupted. Only did 2 shots. Eats veggies ok, no h/o gastric bypass.       Itching- having itching on different areas of her skin, deshawn her scalp arms/thighs, ears. Itches more when getting out of the luke warm shower. Uses dove soap. Uses  Arm and Hammer ointment when needed.        Review of Systems - as stated above in the HPI      Objective:     Vitals:    05/28/25 0904   BP: 100/60   Pulse: 60   Resp: 16   Temp: 97.9 °F (36.6 °C)   TempSrc: Temporal   SpO2: 99%   Weight: 155 lb (70.3 kg)   Height: 5' 3\" (1.6 m)       Physical Examination   General:  Alert, in no acute distress  HEENT: NCAT, EOMI, + mild palpebral pallor B. No nits or adult lice seen. + skin colored mole of the temporal scalp.  Neck:  No cervical lymphadenopathy, no thyromegaly  CV: Regular rate and rhythm. No murmurs, gallops, or rubs.  Lungs:  Clear to auscultation B, no wheezes, rales, or rhonchi, normal respiratory effort  Abd:  +bowel sounds, soft  Ext:  No pedal edema,  Pedal pulses 2+ B  SKIN: no rashes seen of arms        Assessment:     1. Fatigue, unspecified type  - CBC With Differential With Platelet; Future  - Comp Metabolic Panel (14); Future  - Ferritin; Future  - Folic Acid Serum (Folate); Future  - Iron And Tibc; Future  - Vitamin D; Future  - Vitamin B12; Future  - TSH and Free T4; Future    2. Menorrhagia with irregular cycle  - Ferritin; Future  - Iron And Tibc; Future    3. Iron deficiency  - Ferritin; Future  - Iron And Tibc; Future    4. Pruritus  - TSH and Free T4; Future  - ketoconazole 2 % External Shampoo; Apply 5-10 mL to wet scalp, lather, leave on 3 to 5 minutes, and rinse; apply once every 1 to 2 weeks (prevention) or twice weekly for 2 to 4 weeks (treatment)  Dispense: 120 mL; Refill: 0  -likley mild eczema and seborrheic dermatitis  -discussed luke warm bathing, pat dry, moisturization, etc.    5. Seborrheic dermatitis  - ketoconazole 2 % External Shampoo; Apply 5-10 mL to wet scalp, lather, leave on 3 to 5 minutes, and rinse; apply once every 1 to 2 weeks (prevention) or twice weekly for 2 to 4 weeks (treatment)  Dispense: 120 mL; Refill: 0    6. Encounter for screening mammogram for malignant neoplasm of breast  - [facility] JOJO 2D+3D SCREENING BILAT  (CPT=77067/93436); Future    7. Laboratory exam ordered as part of routine general medical examination  - CBC With Differential With Platelet; Future  - Comp Metabolic Panel (14); Future  - Lipid Panel; Future        I am providing care as part of an ongoing, longitudinal care relationship.    Return in about 2 weeks (around 6/11/2025) for review labs.

## 2025-06-14 ENCOUNTER — TELEMEDICINE (OUTPATIENT)
Dept: FAMILY MEDICINE CLINIC | Facility: CLINIC | Age: 40
End: 2025-06-14
Payer: MEDICAID

## 2025-06-14 DIAGNOSIS — E53.8 VITAMIN B 12 DEFICIENCY: ICD-10-CM

## 2025-06-14 DIAGNOSIS — D50.9 IRON DEFICIENCY ANEMIA, UNSPECIFIED IRON DEFICIENCY ANEMIA TYPE: Primary | ICD-10-CM

## 2025-06-14 DIAGNOSIS — N92.1 MENORRHAGIA WITH IRREGULAR CYCLE: ICD-10-CM

## 2025-06-14 DIAGNOSIS — E55.9 VITAMIN D DEFICIENCY: ICD-10-CM

## 2025-06-14 NOTE — PROGRESS NOTES
Video Visit- [facility]  This is a telemedicine visit with live, interactive video and audio.     Rachael Sawyer understands and accepts financial responsibility for any deductible, co-insurance and/or co-pays associated with this service for the date of 06/14/25.      Duration of the service: 27 minutes  8:54-9:21AM      Chief Complaint   Patient presents with    Lab Results        HPI:  Fatigue-     Iron defic anemia-  Hb 8.1 and worse than 2/20/25 which was 8.7. Ferritin <1, TIBC 490, transferrin high at 406, low iron sat and serum iron. Vit B12 156. Did 2 injections ~1 yr ago.  C-scope 10/20/23- poor prep, no bx, Dr. Obrien.  Will get a FIT test  Was referred to gyne and hematology in Feb but not seen and no appts made.  Yesterday got her period    Not a vegetarian  No family h/o blood d/o like thalassemia, sickle cell.  Not a frequent blood donor.     Denies- epistaxis, hematemesis, hemoptysis, melena, hematochezia, hematuria      History 5/28/25: been feeling tired & weak off & on for couple of months. Has a h/o microcytic anemia with her last Hb being 8.7 on 2/20/24. Doesn't snore. Getting 6-7hr sleep/night.  LMP- 5/15/25, Went without a period from Dec to April. Has been like this off and on again.   Periods are very heavy to the point she can't go anywhere otherwise she will have blood gushes.      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  Still gets nauseous from time to time.           Boyfriend has had to file an order of protection for defamation of character form other and has been a nightmare.     Low energy, nausea, bad headaches. Intermittent lightheadedness/dizziness.  Denies- SOB    Periods last 3-10d. Periods are irregular can go q 1-3mo. Has to double up on pads when sits/lays down. On her heaviest day goes through 10-11pads/d. +dime-quarter sized clots.  Used tranexamic acid prn when really heavy and  ran out. Did reduced bleeding and reduced pain. Now taking Motrin.  Sees Dr. Sarah cronin. Was on an OCP but gets headaches on multiple trials of different ones.        H/o Vit B 12 defic- low at 174 on 2/20/24 and started vit b 12 shots, but then got interrupted. Only did 2 shots. Eats veggies ok, no h/o gastric bypass.        Itching- having itching on different areas of her skin, deshawn her scalp arms/thighs, ears. Itches more when getting out of the luke warm shower. Uses dove soap. Uses Arm and Hammer ointment when needed.        Vit d defic- 17.2 on 5/28/25          Past Medical History[1]    Past Surgical History[2]    Family History[3]        Physical Exam:  [facility] 05/14/2025 (Exact Date)     GENERAL APPEARANCE:  Alert, no acute distress, appears stated age  HEENT:  NCAT, EOMI, mucus membranes moist  NECK:  No obvious goiter  PULMONARY:  Speaking in full sentences comfortably, normal work of breathing  ABDOMEN: not obese  MUSCULOSKELETAL: Sitting upright.   NEUROLOGIC:  Cranial nerves 2-12 grossly intact.  PSYCHIATRIC:  Normal mood, affect, and hygiene.        Assessment/Plan:  1. Iron deficiency anemia, unspecified iron deficiency anemia type  - Occult Blood, Fecal, Immunoassay (Blue cards) [E]; Future  - tranexamic acid 650 MG Oral Tab; Take 2 tablets (1,300 mg total) by mouth in the morning, at noon, and at bedtime for 5 days. During your period  Dispense: 45 tablet; Refill: 0  - Ferrous Sulfate 325 (65 Fe) MG Oral Tab; Take 1 tablet (325 mg total) by mouth daily with breakfast.  Dispense: 90 tablet; Refill: 0    2. Menorrhagia with irregular cycle  - tranexamic acid 650 MG Oral Tab; Take 2 tablets (1,300 mg total) by mouth in the morning, at noon, and at bedtime for 5 days. During your period  Dispense: 45 tablet; Refill: 0  - Ferrous Sulfate 325 (65 Fe) MG Oral Tab; Take 1 tablet (325 mg total) by mouth daily with breakfast.  Dispense: 90 tablet; Refill: 0    3. Vitamin D deficiency  - ergocalciferol 1.25  MG (77880 UT) Oral Cap; Take 1 capsule (50,000 Units total) by mouth once a week.  Dispense: 4 capsule; Refill: 2    4. Vitamin B 12 deficiency  - cyanocobalamin (Vitamin B12) 1000 MCG/ML injection 1,000 mcg     -reiterated making appt with hematology and gyne.       No follow-ups on file.      Carmela Reddy MD      Please note that the following visit was completed using two-way, real-time interactive audio and visual communication. This has been done in good jm to provide continuity of care in the best interest of the provider-patient relationship, due to the ongoing public health crisis/national emergency and because of restrictions of visitation. There are limitations of this visit as physical examination was limited.  Appropriate medical decision-making and tests are ordered as detailed in the plan of care above.             [1]   Past Medical History:   Anxiety    Chronic constipation    Depression    IBS (irritable bowel syndrome)    Menorrhagia with irregular cycle    Ovarian cyst    Polycystic ovarian syndrome    PONV (postoperative nausea and vomiting)   [2]   Past Surgical History:  Procedure Laterality Date    Colonoscopy  03/2011    Colonoscopy  10/20/2023    poor prep, no bx, Dr. Obrien    Colonoscopy N/A 10/20/2023    Procedure: COLONOSCOPY;  Surgeon: Heron Obrien MD;  Location: [facility]    D & c      Egd  03/2011    Egd  10/20/2023    chronic inactive gastritis, Dr. Obrien   [3] No family history on file.

## 2025-06-16 RX ORDER — TRANEXAMIC ACID 650 MG/1
1300 TABLET ORAL 3 TIMES DAILY
Qty: 45 TABLET | Refills: 0 | Status: SHIPPED | OUTPATIENT
Start: 2025-06-16 | End: 2025-06-21

## 2025-06-16 RX ORDER — CYANOCOBALAMIN 1000 UG/ML
1000 INJECTION, SOLUTION INTRAMUSCULAR; SUBCUTANEOUS
Status: SHIPPED | OUTPATIENT
Start: 2025-06-16 | End: 2025-12-13

## 2025-06-16 RX ORDER — ERGOCALCIFEROL 1.25 MG/1
50000 CAPSULE, LIQUID FILLED ORAL WEEKLY
Qty: 4 CAPSULE | Refills: 2 | Status: SHIPPED | OUTPATIENT
Start: 2025-06-16

## 2025-06-16 RX ORDER — FERROUS SULFATE 325(65) MG
325 TABLET ORAL
Qty: 90 TABLET | Refills: 0 | Status: SHIPPED | OUTPATIENT
Start: 2025-06-16

## 2025-06-18 ENCOUNTER — HOSPITAL ENCOUNTER (OUTPATIENT)
Dept: MAMMOGRAPHY | Age: 40
Discharge: HOME | End: 2025-06-18
Attending: FAMILY MEDICINE
Payer: MEDICAID

## 2025-06-18 DIAGNOSIS — Z12.31 ENCOUNTER FOR SCREENING MAMMOGRAM FOR MALIGNANT NEOPLASM OF BREAST: ICD-10-CM

## 2025-06-18 PROCEDURE — 77063 BREAST TOMOSYNTHESIS BI: CPT | Performed by: FAMILY MEDICINE

## 2025-06-18 PROCEDURE — 77067 SCR MAMMO BI INCL CAD: CPT | Performed by: FAMILY MEDICINE

## 2025-07-03 ENCOUNTER — TELEPHONE (OUTPATIENT)
Age: 40
End: 2025-07-03

## 2025-07-09 RX ORDER — SODIUM PHOSPHATE, DIBASIC, UNSPECIFIED FORM AND SODIUM PHOSPHATE, MONOBASIC, UNSPECIFIED FORM 7; 19 G/118ML; G/118ML
1 ENEMA RECTAL 2 TIMES DAILY PRN
Qty: 60 TABLET | Refills: 1 | Status: SHIPPED | OUTPATIENT
Start: 2025-07-09

## 2025-07-09 NOTE — TELEPHONE ENCOUNTER
EQ STOOL SOFTENER/LAXATIVE 8.6-50 MG Oral Tab         Sig: Take 1 tablet by mouth 2 (two) times daily as needed.    Disp: 60 tablet    Refills: 1    LILA    Start: 7/8/2025    Class: Normal    Non-formulary    Last ordered: 6 months ago (12/22/2024) by Julian Cain MD    Gastrointestional Medication Protocol [id number]/08/2025 08:25 PM   Protocol Details In person appointment or virtual visit in the past 12 mos or appointment in next 3 mos    Medication is active on med list      To be filled at: [facility] - [place], [place] - [facility] [phone], [phone]

## 2025-07-16 ENCOUNTER — OFFICE VISIT (OUTPATIENT)
Age: 40
End: 2025-07-16
Attending: INTERNAL MEDICINE
Payer: MEDICAID

## 2025-07-16 ENCOUNTER — TELEPHONE (OUTPATIENT)
Age: 40
End: 2025-07-16

## 2025-07-16 VITALS
RESPIRATION RATE: 18 BRPM | HEIGHT: 65 IN | DIASTOLIC BLOOD PRESSURE: 97 MMHG | BODY MASS INDEX: 25.16 KG/M2 | TEMPERATURE: 98 F | SYSTOLIC BLOOD PRESSURE: 144 MMHG | WEIGHT: 151 LBS | OXYGEN SATURATION: 97 % | HEART RATE: 95 BPM

## 2025-07-16 DIAGNOSIS — D50.0 IRON DEFICIENCY ANEMIA DUE TO CHRONIC BLOOD LOSS: Primary | ICD-10-CM

## 2025-07-16 DIAGNOSIS — F41.9 ANXIETY DISORDER, UNSPECIFIED TYPE: ICD-10-CM

## 2025-07-16 DIAGNOSIS — E53.8 B12 DEFICIENCY: ICD-10-CM

## 2025-07-16 NOTE — CONSULTS
[facility] Report of Consultation    Patient Name: Rachael Sawyer   YOB: 1985   Medical Record Number: [id number]   CSN: [id number]   Consulting Physician: Mathew Torres M.D.   Referring Physician: No ref. provider found    Date of Consultation: 2025     Reason for Consultation (Chief Complaint):  No chief complaint on file.       History of Present Illness:  Rachael Sawyer is a 39 year old female referred for evaluation and treatment of severe iron deficiency. The patient reports that she has had very heavy, irregular periods for the past 5 years. During that time, she has also had GI issues that have been diagnosed as IBS-C. EGD and colonoscopy were negative.  She had a CBC in February that demonstrated a hgb of 8. Iron studies revealed a ferritin of 2 and B12 levels were low. Oral iron was recommended and B12 injections ordered. She reports that she did not take the oral iron or B12 injections.   Repeat labs in May, 2025, revealed a hgb 8, Ferritin <1, and B12 156. She was referred for further evaluation.   She complains of fatigue, brittle hair and nails. She has severe anxiety. She has dry, itchy skin.  She has severe headaches.     Past Medical History:  Past Medical History[1]    Past Surgical History:  Past Surgical History[2]    Gynecologic History:  OB History    Para Term  AB Living   1 0 0 0 1 0   SAB IAB Ectopic Multiple Live Births   1 0 0 0 0       Family History:  Family History[3]    Social History:  Social History     Socioeconomic History    Marital status: Single     Spouse name: Not on file    Number of children: Not on file    Years of education: Not on file    Highest education level: Not on file   Occupational History    Not on file   Tobacco Use    Smoking status: Never    Smokeless tobacco: Never   Vaping Use    Vaping status: Never Used   Substance and Sexual Activity    Alcohol use: Not Currently     Comment: over 6 months ago    Drug use:  Never    Sexual activity: Yes     Partners: Male   Other Topics Concern     Service Not Asked    Blood Transfusions Not Asked    Caffeine Concern Yes     Comment: 1x cup daily    Occupational Exposure Not Asked    Hobby Hazards Not Asked    Sleep Concern Not Asked    Stress Concern Not Asked    Weight Concern Not Asked    Special Diet Not Asked    Back Care Not Asked    Exercise Yes     Comment: walk daily    Bike Helmet Not Asked    Seat Belt Yes    Self-Exams Not Asked   Social History Narrative    ** Merged History Encounter **          Social Drivers of Health     Food Insecurity: Not on file   Transportation Needs: Not on file   Housing Stability: Not on file       Current Medications:  Medications - Current[4]    Allergies:  Allergies[5]     Review of Systems:    Constitutional No fevers, chills, night sweats, or weight loss.   Eyes No significant visual difficulties. No diplopia. No yellowing of the eyes.   Hematologic/Lymphatic No easy bruising or bleeding.  No any tender or palpable lymph nodes.   Respiratory No dyspnea, Pleuritic chest pain, cough or hemoptysis.   Cardiovascular No anginal chest pain, palpitations or orthopnea.   Gastrointestinal No nausea, vomiting, diarrhea, GI bleeding.   Genitorurinary (M) No hematuria, dysuria, or incontinence. No abnormal bleeding.   Integumentary No rashes or yellowing of the skin   Neurologic No blurred vision, and no areas of focal weakness. Normal gait.   Psychiatric No insomnia, depression, nuno or mood swings.         Vital Signs:  BP (!) 144/97 (BP Location: Left arm, Patient Position: Sitting, Cuff Size: adult)   Pulse 95   Temp 97.7 °F (36.5 °C) (Tympanic)   Resp 18   Ht 1.651 m (5' 5\")   Wt 68.5 kg (151 lb)   LMP 06/12/2025 (Exact Date)   SpO2 97%   BMI 25.13 kg/m²     Performance Status:  ECOG 0    Physical Examination:    Constitutional Normal - Alert, cooperative, oriented. Mood and affect appropriate. Appears close to chronological age.  Well nourished. Well developed.   Eyes Normal - Conjunctivae and sclerae are clear and without icterus. Pupils are reactive and equal.   ENMT Normal - Sinuses are nontender.  No oral exudates, ulcers, masses, thrush or mucositis. Oropharynx clear.  Tongue normal.   Neck Normal - Supple without masses or thyromegaly.   Hematologic/Lymphatic Normal - No petechiae or purpura.  No tender or palpable lymph nodes in the cervical, supraclavicular, axillary or inguinal area.   Respiratory Normal - Lungs are clear to auscultation without rhonchi or wheezing.   Cardiovascular Normal - Regular rate and rhythm of heart without murmurs, gallops or rubs.   Abdomen Normal - Non-tender, non-distended, no masses, ascites or hepatosplenomegaly. Good bowel sounds. No guarding or rebound tenderness. No pulsatile masses.   Extremities Normal - No visible deformities, no cyanosis, clubbing or edema. Pulses 4+ and equal bilaterally.   Integumentary Normal - No rashes, scars, or lesions suggestive of malignancy.   Neurologic Normal - No sensory or motor deficits, normal cerebellar function, normal gait, cranial nerves intact.   Psychiatric Normal - Alert and oriented times three. Coherent speech. Verbalizes understanding of our discussions today.       Laboratory:      Latest Reference Range & Units 05/28/25 09:52   ANION GAP 0 - 18 mmol/L 9   CALCULATED OSMOLALITY 275 - 295 mOsm/kg 288   ALKALINE PHOSPHATASE 37 - 98 U/L 40   AST (SGOT) <34 U/L 17   ALT (SGPT) 10 - 49 U/L <7 (L)   Total Bilirubin 0.3 - 1.2 mg/dL 0.4   Globulin 2.0 - 3.5 g/dL 3.0   Iron, Serum 50 - 170 ug/dL 11 (L)   Transferrin 250 - 380 mg/dL 406 (H)   Iron Bind.Cap.(TIBC) 250 - 425 ug/dL 490 (H)   Iron Saturation 15 - 50 % 2 (L)   FERRITIN 50 - 306 ng/mL <1 (L)   Vitamin B12 211 - 911 pg/mL 156 (L)   FOLATE (FOLIC ACID), SERUM >5.4 ng/mL 10.0   (L): Data is abnormally low  (H): Data is abnormally high     Latest Reference Range & Units 05/28/25 09:52   WBC 4.0 - 11.0  x10(3) uL 4.4   Hemoglobin 12.0 - 16.0 g/dL 8.1 (L)   Hematocrit 35.0 - 48.0 % 29.1 (L)   Platelet Count 150.0 - 450.0 10(3)uL 336.0   RBC 3.80 - 5.30 x10(6)uL 3.98   MCH 26.0 - 34.0 pg 20.4 (L)   MCHC 31.0 - 37.0 g/dL 27.8 (L)   MCV 80.0 - 100.0 fL 73.1 (L)   RDW % 16.7   Prelim Neutrophil Abs 1.50 - 7.70 x10 (3) uL 2.75   Neutrophils Absolute 1.50 - 7.70 x10(3) uL 2.75   Lymphocytes Absolute 1.00 - 4.00 x10(3) uL 1.21   Monocytes Absolute 0.10 - 1.00 x10(3) uL 0.27   Eosinophils Absolute 0.00 - 0.70 x10(3) uL 0.10   Basophils Absolute 0.00 - 0.20 x10(3) uL 0.04   Immature Granulocyte Absolute 0.00 - 1.00 x10(3) uL 0.01   Neutrophils % % 62.8   Lymphocytes % % 27.6   Monocytes % % 6.2   Eosinophils % % 2.3   Basophils % % 0.9   Immature Granulocyte % % 0.2   (L): Data is abnormally low         Radiology:    Pathology:    Impression and Plan:  Iron deficiency anemia: The patient has severe iron deficiency anemia with a ferritin <1. We reviewed the pathophysiology of iron deficiency and anemia. We discussed that many of her symptoms are likely related to her anemia. She has severe anxiety that is affecting her decision making and I suspect that her iron deficiency is worsening her anxiety. We reviewed the risks and benefits of IV iron and she agreed, however, she declined to have it done today. We will call her to schedule.     B12 deficiency: We reviewed the pathophysiology of B12 deficiency as well. She has been prescribed B12 injections, but has not gone for them. I recommend that she start B12 ODT 1000mcg PO daily. In many patients, these are as effective as injections.     Menorrhagia: The patient describes having to use adult diapers and sitting on towels during her menses. I strongly recommend gynecology follow up. We discussed that her iron deficiency will recur if she does not decrease her menstrual losses.     Anxiety: The patient has severe anxiety that is interfering with her medical care. I recommend  psychiatry evaluation.     HTN: Pt advised to follow up with primary care    Planned Follow Up:  IV iron  Labs in 3 weeks  Labs q3 months for a year.    I spent 60 minutes face to face with the patient.  More than 50% of that time was spent counseling the patient and/or on coordination of care.  The diagnosis, prognosis, and general treatment was explained to the patient and the family.    Risk Level: High    Electronically Signed by:    Mathew Torres M.D.  [facility]         [1]   Past Medical History:   Anxiety    Chronic constipation    Depression    IBS (irritable bowel syndrome)    Menorrhagia with irregular cycle    Ovarian cyst    Polycystic ovarian syndrome    PONV (postoperative nausea and vomiting)   [2]   Past Surgical History:  Procedure Laterality Date    Colonoscopy  03/2011    Colonoscopy  10/20/2023    poor prep, no bx, Dr. Obrien    Colonoscopy N/A 10/20/2023    Procedure: COLONOSCOPY;  Surgeon: Heron Obrien MD;  Location: [facility]    D & c      Egd  03/2011    Egd  10/20/2023    chronic inactive gastritis, Dr. Obrien   [3] No family history on file.  [4]   Current Outpatient Medications:     EQ STOOL SOFTENER/LAXATIVE 8.6-50 MG Oral Tab, Take 1 tablet by mouth 2 (two) times daily as needed., Disp: 60 tablet, Rfl: 1    ergocalciferol 1.25 MG (48041 UT) Oral Cap, Take 1 capsule (50,000 Units total) by mouth once a week., Disp: 4 capsule, Rfl: 2    Ferrous Sulfate 325 (65 Fe) MG Oral Tab, Take 1 tablet (325 mg total) by mouth daily with breakfast., Disp: 90 tablet, Rfl: 0    ondansetron 4 MG Oral Tablet Dispersible, Take 1 tablet (4 mg total) by mouth every 8 (eight) hours as needed for Nausea., Disp: 30 tablet, Rfl: 0    ketoconazole 2 % External Shampoo, Apply 5-10 mL to wet scalp, lather, leave on 3 to 5 minutes, and rinse; apply once every 1 to 2 weeks (prevention) or twice weekly for 2 to 4 weeks (treatment), Disp: 120 mL, Rfl: 0     ALPRAZolam 0.25 MG Oral Tab, Take 1 tablet (0.25 mg total) by mouth daily as needed (panic attack). Needs appt for further refills, Disp: 30 tablet, Rfl: 0    sennosides 8.6 MG Oral Tab, Take 1 tablet (8.6 mg total) by mouth 2 (two) times daily as needed (severe constipation)., Disp: 20 tablet, Rfl: 0  [5]   Allergies  Allergen Reactions    Sulfa Antibiotics HIVES    Ciprofloxacin HALLUCINATION     + dizzy

## 2025-07-16 NOTE — PROGRESS NOTES
Patient is here today for Hematology Consult with  for Iron Deficiency Anemia. Patient denies pain. B/P 144/97. Patient crying does not want iron infusion today. Stated very anxious. Aware Dr. Torres will discuss her Iron deficiency with her today.  Medication list and medical history reviewed and updated.     Education Record    Learner:  Patient      Disease / Diagnosis: Iron Deficiency Anemia  //  Consult    Barriers / Limitations:  None   Comments:    Method:  Brief focused, Discussion, Printed material and Reinforcement   Comments:    General Topics:  Medication, Pain, Procedure and Plan of care reviewed   Comments:    Outcome:  Shows understanding   Comments:      AVS provided and follow up reviewed.  Patient instructed to call as needed.

## 2025-07-19 DIAGNOSIS — R11.0 NAUSEA: ICD-10-CM

## 2025-07-19 DIAGNOSIS — F41.0 PANIC ATTACK: ICD-10-CM

## 2025-07-19 DIAGNOSIS — F40.00 AGORAPHOBIA: ICD-10-CM

## 2025-07-21 RX ORDER — ONDANSETRON 4 MG/1
4 TABLET, ORALLY DISINTEGRATING ORAL EVERY 8 HOURS PRN
Qty: 30 TABLET | Refills: 0 | Status: SHIPPED | OUTPATIENT
Start: 2025-07-21

## 2025-07-21 RX ORDER — ALPRAZOLAM 0.25 MG
0.25 TABLET ORAL DAILY PRN
Qty: 30 TABLET | Refills: 1 | Status: SHIPPED | OUTPATIENT
Start: 2025-07-21

## 2025-07-21 NOTE — TELEPHONE ENCOUNTER
Requesting Alprazolam 0.25mg  Last OV: 6/14/25 Telemdicine  RTC: 1 month  Last Rx'd 12/22/24 #30 with 0 refills    Future Appointments   Date Time Provider Department Center   7/22/2025 11:00 AM PF DAVID RM1 PF [facility]   7/25/2025  1:30 PM PF TX RN2 PF [facility]         Controlled med:  Rx pended and routed for approval/denial

## 2025-07-21 NOTE — TELEPHONE ENCOUNTER
Requesting Zofran 4mg  Last OV: 5/28/25  RTC: 2 weeks  Last Rx'd 5/28/25 #30 with 0 refills    Future Appointments   Date Time Provider Department Center   7/22/2025 11:00 AM PF DAVID RM1 PF [facility]   7/25/2025  1:30 PM PF TX RN2 PF [facility]       Non-protocol med:  Rx pended and routed for approval/denial

## 2025-07-22 ENCOUNTER — HOSPITAL ENCOUNTER (OUTPATIENT)
Dept: MAMMOGRAPHY | Age: 40
Discharge: HOME | End: 2025-07-22
Attending: FAMILY MEDICINE
Payer: MEDICAID

## 2025-07-22 ENCOUNTER — HOSPITAL ENCOUNTER (OUTPATIENT)
Dept: ULTRASOUND IMAGING | Age: 40
Discharge: HOME | End: 2025-07-22
Attending: FAMILY MEDICINE
Payer: MEDICAID

## 2025-07-22 DIAGNOSIS — R92.2 INCONCLUSIVE MAMMOGRAM: ICD-10-CM

## 2025-07-22 PROCEDURE — 77061 BREAST TOMOSYNTHESIS UNI: CPT | Performed by: FAMILY MEDICINE

## 2025-07-22 PROCEDURE — 76642 ULTRASOUND BREAST LIMITED: CPT | Performed by: FAMILY MEDICINE

## 2025-07-22 PROCEDURE — 77065 DX MAMMO INCL CAD UNI: CPT | Performed by: FAMILY MEDICINE

## 2025-07-25 ENCOUNTER — APPOINTMENT (OUTPATIENT)
Facility: LOCATION | Age: 40
End: 2025-07-25
Attending: INTERNAL MEDICINE
Payer: MEDICAID

## (undated) DIAGNOSIS — F40.00 AGORAPHOBIA: ICD-10-CM

## (undated) DIAGNOSIS — K62.5 RECTAL BLEEDING: Primary | ICD-10-CM

## (undated) DIAGNOSIS — F41.0 PANIC ATTACK: ICD-10-CM

## (undated) DIAGNOSIS — F33.1 MODERATE EPISODE OF RECURRENT MAJOR DEPRESSIVE DISORDER (HCC): ICD-10-CM

## (undated) DEVICE — Device: Brand: DUAL NARE NASAL CANNULAE FEMALE LUER CON 7FT O2 TUBE

## (undated) DEVICE — FORCEPS BX L240CM DIA2.4MM L NDL RAD JAW 4

## (undated) DEVICE — CONMED SCOPE SAVER BITE BLOCK, 20X27 MM: Brand: SCOPE SAVER

## (undated) DEVICE — KIT CLEAN ENDOKIT 1.1OZ GOWNX2

## (undated) DEVICE — 60 ML SYRINGE REGULAR TIP: Brand: MONOJECT

## (undated) DEVICE — KIT ENDO ORCAPOD 160/180/190

## (undated) NOTE — LETTER
[address] [facility] [address] [facility] [place]  Authorization for Surgical Operation and Procedure                                                                                           I hereby authorize Kalpana Tapia MD, my physician and his/her assistants (if applicable), which may include medical students, residents, and/or fellows, to perform the following surgical operation/ procedure and administer such anesthesia as may be determined necessary by my physician: Operation/Procedure name (s) COLONOSCOPY / ESOPHAGOGASTRODUODENOSCOPY on Mackenzie Dyer   2. I recognize that during the surgical operation/procedure, unforeseen conditions may necessitate additional or different procedures than those listed above. I, therefore, further authorize and request that the above-named surgeon, assistants, or designees perform such procedures as are, in their judgment, necessary and desirable. 3.   My surgeon/physician has discussed prior to my surgery the potential benefits, risks and side effects of this procedure; the likelihood of achieving goals; and potential problems that might occur during recuperation. They also discussed reasonable alternatives to the procedure, including risks, benefits, and side effects related to the alternatives and risks related to not receiving this procedure. I have had all my questions answered and I acknowledge that no guarantee has been made as to the result that may be obtained. 4.   Should the need arise during my operation/procedure, which includes change of level of care prior to discharge, I also consent to the administration of blood and/or blood products. Further, I understand that despite careful testing and screening of blood or blood products by collecting agencies, I may still be subject to ill effects as a result of receiving a blood transfusion and/or blood products.   The following are some, but not all, of the potential risks that can occur: fever and allergic reactions, hemolytic reactions, transmission of diseases such as Hepatitis, AIDS and Cytomegalovirus (CMV) and fluid overload. In the event that I wish to have an autologous transfusion of my own blood, or a directed donor transfusion, I will discuss this with my physician. Check only if Refusing Blood or Blood Products  I understand refusal of blood or blood products as deemed necessary by my physician may have serious consequences to my condition to include possible death. I hereby assume responsibility for my refusal and release the hospital, its personnel, and my physicians from any responsibility for the consequences of my refusal.    o  Refuse   5. I authorize the use of any specimen, organs, tissues, body parts or foreign objects that may be removed from my body during the operation/procedure for diagnosis, research or teaching purposes and their subsequent disposal by hospital authorities. I also authorize the release of specimen test results and/or written reports to my treating physician on the hospital medical staff or other referring or consulting physicians involved in my care, at the discretion of the Pathologist or my treating physician. 6.   I consent to the photographing or videotaping of the operations or procedures to be performed, including appropriate portions of my body for medical, scientific, or educational purposes, provided my identity is not revealed by the pictures or by descriptive texts accompanying them. If the procedure has been photographed/videotaped, the surgeon will obtain the original picture, image, videotape or CD. The hospital will not be responsible for storage, release or maintenance of the picture, image, tape or CD.    7.   I consent to the presence of a  or observers in the operating room as deemed necessary by my physician or their designees.     8.   I recognize that in the event my procedure results in extended X-Ray/fluoroscopy time, I may develop a skin reaction. 9. If I have a Do Not Attempt Resuscitation (DNAR) order in place, that status will be suspended while in the operating room, procedural suite, and during the recovery period unless otherwise explicitly stated by me (or a person authorized to consent on my behalf). The surgeon or my attending physician will determine when the applicable recovery period ends for purposes of reinstating the DNAR order. 10. Patients having a sterilization procedure: I understand that if the procedure is successful the results will be permanent and it will therefore be impossible for me to inseminate, conceive, or bear children. I also understand that the procedure is intended to result in sterility, although the result has not been guaranteed. 11. I acknowledge that my physician has explained sedation/analgesia administration to me including the risk and benefits I consent to the administration of sedation/analgesia as may be necessary or desirable in the judgment of my physician. I CERTIFY THAT I HAVE READ AND FULLY UNDERSTAND THE ABOVE CONSENT TO OPERATION and/or OTHER PROCEDURE.     _________________________________________ _________________________________     ___________________________________  Signature of Patient     Signature of Responsible Person                   Printed Name of Responsible Person                              _________________________________________ ______________________________        ___________________________________  Signature of Witness         Date  Time         Relationship to Patient    STATEMENT OF PHYSICIAN My signature below affirms that prior to the time of the procedure; I have explained to the patient and/or his/her legal representative, the risks and benefits involved in the proposed treatment and any reasonable alternative to the proposed treatment.  I have also explained the risks and benefits involved in refusal of the proposed treatment and alternatives to the proposed treatment and have answered the patient's questions.  If I have a significant financial interest in a co-management agreement or a significant financial interest in any product or implant, or other significant relationship used in this procedure/surgery, I have disclosed this and had a discussion with my patient.     _______________________________________________________________ _____________________________  Blayne Spencer Physician)                                                                                         (Date)                                   (Time)  Patient Name: Keturah Gonzalez    : 1985   Printed: 10/17/2023      Medical Record #: [id number]                                              Page 1 of 1

## (undated) NOTE — LETTER
10/30/2023              Jennifer Gregory        [address] [id number]         Dear Cristhian Butler,    [facility] records indicate that the tests ordered for you by Adam Sow MD  have not been done. If you have, in fact, already completed the tests or you do not wish to have the tests done, please contact our office at [address] [place] [address]. Otherwise, please proceed with the testing.       Sincerely,    Adam Sow MD  [facility], [address] [address]  [phone]

## (undated) NOTE — ED AVS SNAPSHOT
Nidia Kendall   MRN: [id number]    Department:  [facility] Emergency Department   Date of Visit:  10/3/2019           Disclosure     Insurance plans vary and the physician(s) referred by the ER may not be covered by your plan.  Please contact y tell this physician (or your personal doctor if your instructions are to return to your personal doctor) about any new or lasting problems. The primary care or specialist physician will see patients referred from the [facility] Emergency Department.  Roly Coffman

## (undated) NOTE — ED AVS SNAPSHOT
Nannette Herbert   MRN: [id number]    Department:  [facility] Emergency Department   Date of Visit:  3/20/2018           Disclosure     Insurance plans vary and the physician(s) referred by the ER may not be covered by your plan.  Please contact y tell this physician (or your personal doctor if your instructions are to return to your personal doctor) about any new or lasting problems. The primary care or specialist physician will see patients referred from the [facility] Emergency Department.  Marco Antonio Sidhu

## (undated) NOTE — ED AVS SNAPSHOT
Jani Guajardo   MRN: [id number]    Department:  [facility] Emergency Department   Date of Visit:  8/11/2019           Disclosure     Insurance plans vary and the physician(s) referred by the ER may not be covered by your plan.  Please contact y tell this physician (or your personal doctor if your instructions are to return to your personal doctor) about any new or lasting problems. The primary care or specialist physician will see patients referred from the [facility] Emergency Department.  Natanael Zhao